# Patient Record
Sex: MALE | Race: WHITE | URBAN - METROPOLITAN AREA
[De-identification: names, ages, dates, MRNs, and addresses within clinical notes are randomized per-mention and may not be internally consistent; named-entity substitution may affect disease eponyms.]

---

## 2018-09-12 PROCEDURE — 82570 ASSAY OF URINE CREATININE: CPT | Performed by: FAMILY MEDICINE

## 2018-09-12 PROCEDURE — 82043 UR ALBUMIN QUANTITATIVE: CPT | Performed by: FAMILY MEDICINE

## 2018-09-12 PROCEDURE — 84153 ASSAY OF PSA TOTAL: CPT | Performed by: FAMILY MEDICINE

## 2018-09-13 PROBLEM — E11.69 DYSLIPIDEMIA WITH LOW HIGH DENSITY LIPOPROTEIN (HDL) CHOLESTEROL WITH HYPERTRIGLYCERIDEMIA DUE TO TYPE 2 DIABETES MELLITUS  (HCC): Status: ACTIVE | Noted: 2018-09-13

## 2018-09-13 PROBLEM — E78.2 DYSLIPIDEMIA WITH LOW HIGH DENSITY LIPOPROTEIN (HDL) CHOLESTEROL WITH HYPERTRIGLYCERIDEMIA DUE TO TYPE 2 DIABETES MELLITUS: Status: ACTIVE | Noted: 2018-09-13

## 2018-09-13 PROBLEM — E06.3 HASHIMOTO'S THYROIDITIS: Status: ACTIVE | Noted: 2018-09-13

## 2018-09-13 PROBLEM — E11.69 DYSLIPIDEMIA WITH LOW HIGH DENSITY LIPOPROTEIN (HDL) CHOLESTEROL WITH HYPERTRIGLYCERIDEMIA DUE TO TYPE 2 DIABETES MELLITUS: Status: ACTIVE | Noted: 2018-09-13

## 2018-09-13 PROBLEM — I15.2 HYPERTENSION ASSOCIATED WITH DIABETES (HCC): Status: ACTIVE | Noted: 2018-09-13

## 2018-09-13 PROBLEM — I15.2 HYPERTENSION ASSOCIATED WITH DIABETES: Status: ACTIVE | Noted: 2018-09-13

## 2018-09-13 PROBLEM — E78.2 DYSLIPIDEMIA WITH LOW HIGH DENSITY LIPOPROTEIN (HDL) CHOLESTEROL WITH HYPERTRIGLYCERIDEMIA DUE TO TYPE 2 DIABETES MELLITUS (HCC): Status: ACTIVE | Noted: 2018-09-13

## 2018-09-13 PROBLEM — E11.59 HYPERTENSION ASSOCIATED WITH DIABETES: Status: ACTIVE | Noted: 2018-09-13

## 2018-09-13 PROBLEM — E11.69 DYSLIPIDEMIA WITH LOW HIGH DENSITY LIPOPROTEIN (HDL) CHOLESTEROL WITH HYPERTRIGLYCERIDEMIA DUE TO TYPE 2 DIABETES MELLITUS (HCC): Status: ACTIVE | Noted: 2018-09-13

## 2018-09-13 PROBLEM — E11.59 HYPERTENSION ASSOCIATED WITH DIABETES (HCC): Status: ACTIVE | Noted: 2018-09-13

## 2018-09-13 PROBLEM — I15.2 HYPERTENSION ASSOCIATED WITH DIABETES  (HCC): Status: ACTIVE | Noted: 2018-09-13

## 2018-09-13 PROBLEM — E11.59 HYPERTENSION ASSOCIATED WITH DIABETES  (HCC): Status: ACTIVE | Noted: 2018-09-13

## 2018-09-13 PROBLEM — E78.2 DYSLIPIDEMIA WITH LOW HIGH DENSITY LIPOPROTEIN (HDL) CHOLESTEROL WITH HYPERTRIGLYCERIDEMIA DUE TO TYPE 2 DIABETES MELLITUS  (HCC): Status: ACTIVE | Noted: 2018-09-13

## 2018-12-07 PROBLEM — R80.9 TYPE 2 DIABETES MELLITUS WITH MICROALBUMINURIA, WITH LONG-TERM CURRENT USE OF INSULIN (HCC): Status: ACTIVE | Noted: 2018-12-07

## 2018-12-07 PROBLEM — Z79.4 TYPE 2 DIABETES MELLITUS WITH MICROALBUMINURIA, WITH LONG-TERM CURRENT USE OF INSULIN (HCC): Status: ACTIVE | Noted: 2018-12-07

## 2018-12-07 PROBLEM — E11.29 TYPE 2 DIABETES MELLITUS WITH MICROALBUMINURIA, WITH LONG-TERM CURRENT USE OF INSULIN (HCC): Status: ACTIVE | Noted: 2018-12-07

## 2020-12-09 PROBLEM — I10 ESSENTIAL HYPERTENSION: Status: ACTIVE | Noted: 2018-09-13

## 2020-12-09 PROBLEM — N52.9 ERECTILE DYSFUNCTION, UNSPECIFIED ERECTILE DYSFUNCTION TYPE: Status: ACTIVE | Noted: 2020-12-09

## 2021-05-12 ENCOUNTER — HOSPITAL ENCOUNTER (EMERGENCY)
Facility: HOSPITAL | Age: 51
Discharge: HOME | End: 2021-05-13
Attending: EMERGENCY MEDICINE
Payer: COMMERCIAL

## 2021-05-12 ENCOUNTER — APPOINTMENT (OUTPATIENT)
Dept: CT IMAGING | Facility: HOSPITAL | Age: 51
End: 2021-05-12
Attending: EMERGENCY MEDICINE
Payer: COMMERCIAL

## 2021-05-12 DIAGNOSIS — R51.9 NONINTRACTABLE HEADACHE, UNSPECIFIED CHRONICITY PATTERN, UNSPECIFIED HEADACHE TYPE: Primary | ICD-10-CM

## 2021-05-12 PROCEDURE — 99284 EMERGENCY DEPT VISIT MOD MDM: CPT

## 2021-05-12 PROCEDURE — 70450 CT HEAD/BRAIN W/O DYE: CPT | Performed by: EMERGENCY MEDICINE

## 2021-05-12 PROCEDURE — 85025 COMPLETE CBC W/AUTO DIFF WBC: CPT | Performed by: EMERGENCY MEDICINE

## 2021-05-12 PROCEDURE — 96374 THER/PROPH/DIAG INJ IV PUSH: CPT

## 2021-05-12 PROCEDURE — 96361 HYDRATE IV INFUSION ADD-ON: CPT

## 2021-05-12 PROCEDURE — 96375 TX/PRO/DX INJ NEW DRUG ADDON: CPT

## 2021-05-12 PROCEDURE — 80053 COMPREHEN METABOLIC PANEL: CPT | Performed by: EMERGENCY MEDICINE

## 2021-05-12 RX ORDER — MORPHINE SULFATE 4 MG/ML
4 INJECTION, SOLUTION INTRAMUSCULAR; INTRAVENOUS EVERY 30 MIN PRN
Status: DISCONTINUED | OUTPATIENT
Start: 2021-05-12 | End: 2021-05-13

## 2021-05-13 VITALS
SYSTOLIC BLOOD PRESSURE: 143 MMHG | BODY MASS INDEX: 32.28 KG/M2 | TEMPERATURE: 99 F | HEART RATE: 85 BPM | RESPIRATION RATE: 18 BRPM | OXYGEN SATURATION: 90 % | HEIGHT: 68 IN | WEIGHT: 213 LBS | DIASTOLIC BLOOD PRESSURE: 72 MMHG

## 2021-05-13 RX ORDER — ONDANSETRON 4 MG/1
4 TABLET, ORALLY DISINTEGRATING ORAL EVERY 4 HOURS PRN
Qty: 10 TABLET | Refills: 0 | Status: SHIPPED | OUTPATIENT
Start: 2021-05-13 | End: 2021-05-20

## 2021-05-13 RX ORDER — KETOROLAC TROMETHAMINE 30 MG/ML
15 INJECTION, SOLUTION INTRAMUSCULAR; INTRAVENOUS ONCE
Status: COMPLETED | OUTPATIENT
Start: 2021-05-13 | End: 2021-05-13

## 2021-05-13 NOTE — ED PROVIDER NOTES
Patient Seen in: [facility] Emergency Department      History   Patient presents with:  Headache    Stated Complaint: headache     HPI/Subjective:   HPI    Kamron Hardy is a pleasant 49-year-old insulin-dependent diabetic who presents emergency department with no auricular preauricular or mastoid tenderness. Oropharyngeal exam shows no uvula edema or shift. There is no tongue elevation and palatine tonsils show no purulent material or erythema.   No submandibular erythema and no tenderness along the sterno and contributing to the patient's headache.   Patient has no signs of neurologic deficits he will be discharged home he is to return the emergency department for worsening symptoms other complaints                             Disposition and Plan     Clinic

## 2021-11-17 PROBLEM — IMO0002: Status: ACTIVE | Noted: 2018-12-07

## 2021-11-17 PROBLEM — E11.65: Status: ACTIVE | Noted: 2018-12-07

## 2021-11-17 PROBLEM — E11.3393: Status: ACTIVE | Noted: 2018-12-07

## 2021-12-27 ENCOUNTER — IMMUNIZATION (OUTPATIENT)
Dept: LAB | Facility: HOSPITAL | Age: 51
End: 2021-12-27
Attending: EMERGENCY MEDICINE
Payer: COMMERCIAL

## 2021-12-27 DIAGNOSIS — Z23 NEED FOR VACCINATION: Primary | ICD-10-CM

## 2021-12-27 PROCEDURE — 0001A SARSCOV2 VAC 30MCG/0.3ML IM: CPT

## 2022-01-17 ENCOUNTER — IMMUNIZATION (OUTPATIENT)
Dept: LAB | Facility: HOSPITAL | Age: 52
End: 2022-01-17
Attending: EMERGENCY MEDICINE
Payer: COMMERCIAL

## 2022-01-17 DIAGNOSIS — Z23 NEED FOR VACCINATION: Primary | ICD-10-CM

## 2022-01-17 PROCEDURE — 0002A SARSCOV2 VAC 30MCG/0.3ML IM: CPT

## 2022-01-17 PROCEDURE — 0052A SARSCOV2 VAC 30MCG/0.3ML IM: CPT

## 2022-09-13 ENCOUNTER — IMMUNIZATION (OUTPATIENT)
Dept: LAB | Age: 52
End: 2022-09-13
Attending: EMERGENCY MEDICINE
Payer: COMMERCIAL

## 2022-09-13 DIAGNOSIS — Z23 NEED FOR VACCINATION: Primary | ICD-10-CM

## 2022-09-13 PROCEDURE — 0124A SARSCOV2 VAC BVL 30MCG/0.3ML: CPT

## 2023-08-19 ENCOUNTER — APPOINTMENT (OUTPATIENT)
Dept: CT IMAGING | Facility: HOSPITAL | Age: 53
End: 2023-08-19
Attending: EMERGENCY MEDICINE
Payer: COMMERCIAL

## 2023-08-19 ENCOUNTER — APPOINTMENT (OUTPATIENT)
Dept: GENERAL RADIOLOGY | Facility: HOSPITAL | Age: 53
End: 2023-08-19
Attending: EMERGENCY MEDICINE
Payer: COMMERCIAL

## 2023-08-19 ENCOUNTER — HOSPITAL ENCOUNTER (INPATIENT)
Facility: HOSPITAL | Age: 53
LOS: 4 days | Discharge: HOME | End: 2023-08-23
Attending: EMERGENCY MEDICINE | Admitting: HOSPITALIST
Payer: COMMERCIAL

## 2023-08-19 ENCOUNTER — APPOINTMENT (OUTPATIENT)
Dept: MRI IMAGING | Facility: HOSPITAL | Age: 53
End: 2023-08-19
Attending: Other
Payer: COMMERCIAL

## 2023-08-19 DIAGNOSIS — R47.81 SLURRED SPEECH: ICD-10-CM

## 2023-08-19 DIAGNOSIS — R53.1 WEAKNESS GENERALIZED: Primary | ICD-10-CM

## 2023-08-19 LAB
ALBUMIN SERPL-MCNC: 3.9 G/DL (ref 3.4–5)
ALBUMIN/GLOB SERPL: 1 {RATIO} (ref 1–2)
ALP LIVER SERPL-CCNC: 86 U/L
ALT SERPL-CCNC: 31 U/L
ANION GAP SERPL CALC-SCNC: 5 MMOL/L (ref 0–18)
APTT PPP: 24.8 SECONDS (ref 23.3–35.6)
AST SERPL-CCNC: 16 U/L (ref 15–37)
ATRIAL RATE: 77 BPM
BASOPHILS # BLD AUTO: 0.08 X10(3) UL (ref 0–0.2)
BASOPHILS NFR BLD AUTO: 1.1 %
BILIRUB SERPL-MCNC: 0.8 MG/DL (ref 0.1–2)
BUN BLD-MCNC: 23 MG/DL (ref 7–18)
CALCIUM BLD-MCNC: 9.5 MG/DL (ref 8.5–10.1)
CHLORIDE SERPL-SCNC: 107 MMOL/L (ref 98–112)
CK SERPL-CCNC: 80 U/L
CO2 SERPL-SCNC: 24 MMOL/L (ref 21–32)
CREAT BLD-MCNC: 1.27 MG/DL
EGFRCR SERPLBLD CKD-EPI 2021: 67 ML/MIN/1.73M2 (ref 60–?)
EOSINOPHIL # BLD AUTO: 0.31 X10(3) UL (ref 0–0.7)
EOSINOPHIL NFR BLD AUTO: 4.4 %
ERYTHROCYTE [DISTWIDTH] IN BLOOD BY AUTOMATED COUNT: 12.6 %
EST. AVERAGE GLUCOSE BLD GHB EST-MCNC: 200 MG/DL (ref 68–126)
GLOBULIN PLAS-MCNC: 4 G/DL (ref 2.8–4.4)
GLUCOSE BLD-MCNC: 123 MG/DL (ref 70–99)
GLUCOSE BLD-MCNC: 129 MG/DL (ref 70–99)
GLUCOSE BLD-MCNC: 140 MG/DL (ref 70–99)
GLUCOSE BLD-MCNC: 157 MG/DL (ref 70–99)
HBA1C MFR BLD: 8.6 % (ref ?–5.7)
HCT VFR BLD AUTO: 44.9 %
HGB BLD-MCNC: 15.3 G/DL
IGA SERPL-MCNC: 287 MG/DL (ref 70–312)
IMM GRANULOCYTES # BLD AUTO: 0.02 X10(3) UL (ref 0–1)
IMM GRANULOCYTES NFR BLD: 0.3 %
INR BLD: 0.96 (ref 0.85–1.16)
LYMPHOCYTES # BLD AUTO: 2.26 X10(3) UL (ref 1–4)
LYMPHOCYTES NFR BLD AUTO: 32.2 %
MCH RBC QN AUTO: 28.7 PG (ref 26–34)
MCHC RBC AUTO-ENTMCNC: 34.1 G/DL (ref 31–37)
MCV RBC AUTO: 84.1 FL
MONOCYTES # BLD AUTO: 0.59 X10(3) UL (ref 0.1–1)
MONOCYTES NFR BLD AUTO: 8.4 %
NEUTROPHILS # BLD AUTO: 3.75 X10 (3) UL (ref 1.5–7.7)
NEUTROPHILS # BLD AUTO: 3.75 X10(3) UL (ref 1.5–7.7)
NEUTROPHILS NFR BLD AUTO: 53.6 %
OSMOLALITY SERPL CALC.SUM OF ELEC: 289 MOSM/KG (ref 275–295)
P AXIS: 42 DEGREES
P-R INTERVAL: 140 MS
PLATELET # BLD AUTO: 245 10(3)UL (ref 150–450)
POTASSIUM SERPL-SCNC: 4.5 MMOL/L (ref 3.5–5.1)
PROT SERPL-MCNC: 7.9 G/DL (ref 6.4–8.2)
PROTHROMBIN TIME: 12.8 SECONDS (ref 11.6–14.8)
Q-T INTERVAL: 378 MS
QRS DURATION: 94 MS
QTC CALCULATION (BEZET): 427 MS
R AXIS: 14 DEGREES
RBC # BLD AUTO: 5.34 X10(6)UL
SODIUM SERPL-SCNC: 136 MMOL/L (ref 136–145)
T AXIS: 40 DEGREES
TROPONIN I HIGH SENSITIVITY: 4 NG/L
TSI SER-ACNC: 2.14 MIU/ML (ref 0.36–3.74)
VENTRICULAR RATE: 77 BPM
WBC # BLD AUTO: 7 X10(3) UL (ref 4–11)

## 2023-08-19 PROCEDURE — 70450 CT HEAD/BRAIN W/O DYE: CPT | Performed by: EMERGENCY MEDICINE

## 2023-08-19 PROCEDURE — 70551 MRI BRAIN STEM W/O DYE: CPT | Performed by: OTHER

## 2023-08-19 PROCEDURE — 70547 MR ANGIOGRAPHY NECK W/O DYE: CPT | Performed by: OTHER

## 2023-08-19 PROCEDURE — 009U3ZX DRAINAGE OF SPINAL CANAL, PERCUTANEOUS APPROACH, DIAGNOSTIC: ICD-10-PCS | Performed by: RADIOLOGY

## 2023-08-19 PROCEDURE — 71045 X-RAY EXAM CHEST 1 VIEW: CPT | Performed by: EMERGENCY MEDICINE

## 2023-08-19 PROCEDURE — 70544 MR ANGIOGRAPHY HEAD W/O DYE: CPT | Performed by: OTHER

## 2023-08-19 PROCEDURE — 73110 X-RAY EXAM OF WRIST: CPT | Performed by: EMERGENCY MEDICINE

## 2023-08-19 PROCEDURE — 99223 1ST HOSP IP/OBS HIGH 75: CPT | Performed by: OTHER

## 2023-08-19 RX ORDER — HYDRALAZINE HYDROCHLORIDE 20 MG/ML
10 INJECTION INTRAMUSCULAR; INTRAVENOUS EVERY 2 HOUR PRN
Status: DISCONTINUED | OUTPATIENT
Start: 2023-08-19 | End: 2023-08-23

## 2023-08-19 RX ORDER — SODIUM CHLORIDE 9 MG/ML
INJECTION, SOLUTION INTRAVENOUS CONTINUOUS
Status: DISCONTINUED | OUTPATIENT
Start: 2023-08-19 | End: 2023-08-23

## 2023-08-19 RX ORDER — ASPIRIN 300 MG/1
300 SUPPOSITORY RECTAL DAILY
Status: DISCONTINUED | OUTPATIENT
Start: 2023-08-20 | End: 2023-08-21

## 2023-08-19 RX ORDER — LEVOTHYROXINE SODIUM 0.15 MG/1
150 TABLET ORAL
Status: DISCONTINUED | OUTPATIENT
Start: 2023-08-20 | End: 2023-08-23

## 2023-08-19 RX ORDER — NICOTINE POLACRILEX 4 MG
15 LOZENGE BUCCAL
Status: DISCONTINUED | OUTPATIENT
Start: 2023-08-19 | End: 2023-08-23

## 2023-08-19 RX ORDER — PROCHLORPERAZINE EDISYLATE 5 MG/ML
5 INJECTION INTRAMUSCULAR; INTRAVENOUS EVERY 8 HOURS PRN
Status: DISCONTINUED | OUTPATIENT
Start: 2023-08-19 | End: 2023-08-23

## 2023-08-19 RX ORDER — ASPIRIN 325 MG
325 TABLET, DELAYED RELEASE (ENTERIC COATED) ORAL DAILY
Status: DISCONTINUED | OUTPATIENT
Start: 2023-08-19 | End: 2023-08-19

## 2023-08-19 RX ORDER — LABETALOL HYDROCHLORIDE 5 MG/ML
10 INJECTION, SOLUTION INTRAVENOUS EVERY 10 MIN PRN
Status: DISCONTINUED | OUTPATIENT
Start: 2023-08-19 | End: 2023-08-23

## 2023-08-19 RX ORDER — ACETAMINOPHEN 650 MG/1
650 SUPPOSITORY RECTAL EVERY 4 HOURS PRN
Status: DISCONTINUED | OUTPATIENT
Start: 2023-08-19 | End: 2023-08-23

## 2023-08-19 RX ORDER — ONDANSETRON 2 MG/ML
4 INJECTION INTRAMUSCULAR; INTRAVENOUS EVERY 6 HOURS PRN
Status: DISCONTINUED | OUTPATIENT
Start: 2023-08-19 | End: 2023-08-23

## 2023-08-19 RX ORDER — LISINOPRIL 10 MG/1
10 TABLET ORAL DAILY
Status: DISCONTINUED | OUTPATIENT
Start: 2023-08-20 | End: 2023-08-19

## 2023-08-19 RX ORDER — NICOTINE POLACRILEX 4 MG
30 LOZENGE BUCCAL
Status: DISCONTINUED | OUTPATIENT
Start: 2023-08-19 | End: 2023-08-23

## 2023-08-19 RX ORDER — ACETAMINOPHEN 325 MG/1
650 TABLET ORAL EVERY 4 HOURS PRN
Status: DISCONTINUED | OUTPATIENT
Start: 2023-08-19 | End: 2023-08-23

## 2023-08-19 RX ORDER — ACETAMINOPHEN 500 MG
1000 TABLET ORAL ONCE
Status: COMPLETED | OUTPATIENT
Start: 2023-08-19 | End: 2023-08-19

## 2023-08-19 RX ORDER — ACETAMINOPHEN 500 MG
500 TABLET ORAL EVERY 4 HOURS PRN
Status: DISCONTINUED | OUTPATIENT
Start: 2023-08-19 | End: 2023-08-23

## 2023-08-19 RX ORDER — ONDANSETRON 2 MG/ML
4 INJECTION INTRAMUSCULAR; INTRAVENOUS EVERY 6 HOURS PRN
Status: DISCONTINUED | OUTPATIENT
Start: 2023-08-19 | End: 2023-08-19

## 2023-08-19 RX ORDER — ASPIRIN 325 MG
325 TABLET ORAL DAILY
Status: DISCONTINUED | OUTPATIENT
Start: 2023-08-19 | End: 2023-08-19

## 2023-08-19 RX ORDER — DEXTROSE MONOHYDRATE 25 G/50ML
50 INJECTION, SOLUTION INTRAVENOUS
Status: DISCONTINUED | OUTPATIENT
Start: 2023-08-19 | End: 2023-08-23

## 2023-08-19 RX ORDER — SODIUM CHLORIDE 9 MG/ML
INJECTION, SOLUTION INTRAVENOUS CONTINUOUS
Status: ACTIVE | OUTPATIENT
Start: 2023-08-20 | End: 2023-08-22

## 2023-08-19 RX ORDER — HEPARIN SODIUM 5000 [USP'U]/ML
5000 INJECTION, SOLUTION INTRAVENOUS; SUBCUTANEOUS EVERY 8 HOURS SCHEDULED
Status: DISCONTINUED | OUTPATIENT
Start: 2023-08-19 | End: 2023-08-23

## 2023-08-19 RX ORDER — ASPIRIN 325 MG
325 TABLET ORAL DAILY
Status: DISCONTINUED | OUTPATIENT
Start: 2023-08-20 | End: 2023-08-21

## 2023-08-19 NOTE — ED INITIAL ASSESSMENT (HPI)
Patient reports c/o bilateral leg/arm weakness, difficulty with concentration, and lower back pain x weeks.

## 2023-08-19 NOTE — ED QUICK NOTES
Orders for admission, patient is aware of plan and ready to go upstairs. Any questions, please call ED RN christina at extension #[phone].      Patient Covid vaccination status: Fully vaccinated     COVID Test Ordered in ED: None    COVID Suspicion at Admission: N/A    Running Infusions:  None    Mental Status/LOC at time of transport: a0x3    Other pertinent information:   CIWA score: N/A   NIH score:  1

## 2023-08-19 NOTE — PROGRESS NOTES
NURSING ADMISSION NOTE      Assumed care of patient when transferred from ED. Neurology and hospitalist notified of consults. Neuro assessments per order. Reports numbness to lips and bilat hands, left greater than right hand. Right hand slight ataxia, states has to concentrate to move rt hand  Reports symptoms worsen with standing and starts to slur occasional word. States symptoms improve once laying back down. MRI stat ordered, paged on call MRI tech via  per MRI department due to long list of ED stat MRI's. Spoke to Radiology dept regarding lumbar puncture, states unable to complete until Monday and SubQ Heparin needs to be held 8-10 hrs prior and stat PT/INR completed morning off L.P. Patient updated on POC. Patient admitted via [address] to room and floor. Safety precautions initiated. Bed in low position. Call light in reach.

## 2023-08-19 NOTE — PROGRESS NOTES
Orthostatic BP results     08/19/23 1334 08/19/23 1336 08/19/23 1338   Vital Signs   /72 126/67 113/72   MAP (mmHg) 87 82 79   BP Location Left arm Left arm Left arm   BP Method Automatic Automatic Automatic   Patient Position Lying Sitting Standing

## 2023-08-20 ENCOUNTER — APPOINTMENT (OUTPATIENT)
Dept: CV DIAGNOSTICS | Facility: HOSPITAL | Age: 53
End: 2023-08-20
Attending: HOSPITALIST
Payer: COMMERCIAL

## 2023-08-20 ENCOUNTER — APPOINTMENT (OUTPATIENT)
Dept: CT IMAGING | Facility: HOSPITAL | Age: 53
End: 2023-08-20
Attending: Other
Payer: COMMERCIAL

## 2023-08-20 LAB
ANION GAP SERPL CALC-SCNC: 3 MMOL/L (ref 0–18)
BASOPHILS # BLD AUTO: 0.06 X10(3) UL (ref 0–0.2)
BASOPHILS NFR BLD AUTO: 0.9 %
BUN BLD-MCNC: 18 MG/DL (ref 7–18)
CALCIUM BLD-MCNC: 8.6 MG/DL (ref 8.5–10.1)
CHLORIDE SERPL-SCNC: 105 MMOL/L (ref 98–112)
CHOLEST SERPL-MCNC: 232 MG/DL (ref ?–200)
CO2 SERPL-SCNC: 26 MMOL/L (ref 21–32)
CREAT BLD-MCNC: 0.93 MG/DL
EGFRCR SERPLBLD CKD-EPI 2021: 98 ML/MIN/1.73M2 (ref 60–?)
EOSINOPHIL # BLD AUTO: 0.35 X10(3) UL (ref 0–0.7)
EOSINOPHIL NFR BLD AUTO: 5.5 %
ERYTHROCYTE [DISTWIDTH] IN BLOOD BY AUTOMATED COUNT: 12.4 %
GLUCOSE BLD-MCNC: 115 MG/DL (ref 70–99)
GLUCOSE BLD-MCNC: 142 MG/DL (ref 70–99)
GLUCOSE BLD-MCNC: 149 MG/DL (ref 70–99)
GLUCOSE BLD-MCNC: 149 MG/DL (ref 70–99)
GLUCOSE BLD-MCNC: 175 MG/DL (ref 70–99)
GLUCOSE BLD-MCNC: 202 MG/DL (ref 70–99)
HCT VFR BLD AUTO: 45.4 %
HDLC SERPL-MCNC: 41 MG/DL (ref 40–59)
HGB BLD-MCNC: 14.9 G/DL
IMM GRANULOCYTES # BLD AUTO: 0.01 X10(3) UL (ref 0–1)
IMM GRANULOCYTES NFR BLD: 0.2 %
LDLC SERPL CALC-MCNC: 141 MG/DL (ref ?–100)
LYMPHOCYTES # BLD AUTO: 2.35 X10(3) UL (ref 1–4)
LYMPHOCYTES NFR BLD AUTO: 37.1 %
MAGNESIUM SERPL-MCNC: 2.1 MG/DL (ref 1.6–2.6)
MCH RBC QN AUTO: 29.1 PG (ref 26–34)
MCHC RBC AUTO-ENTMCNC: 32.8 G/DL (ref 31–37)
MCV RBC AUTO: 88.7 FL
MONOCYTES # BLD AUTO: 0.61 X10(3) UL (ref 0.1–1)
MONOCYTES NFR BLD AUTO: 9.6 %
NEUTROPHILS # BLD AUTO: 2.96 X10 (3) UL (ref 1.5–7.7)
NEUTROPHILS # BLD AUTO: 2.96 X10(3) UL (ref 1.5–7.7)
NEUTROPHILS NFR BLD AUTO: 46.7 %
NONHDLC SERPL-MCNC: 191 MG/DL (ref ?–130)
OSMOLALITY SERPL CALC.SUM OF ELEC: 283 MOSM/KG (ref 275–295)
PLATELET # BLD AUTO: 237 10(3)UL (ref 150–450)
POTASSIUM SERPL-SCNC: 4.2 MMOL/L (ref 3.5–5.1)
RBC # BLD AUTO: 5.12 X10(6)UL
SODIUM SERPL-SCNC: 134 MMOL/L (ref 136–145)
TRIGL SERPL-MCNC: 277 MG/DL (ref 30–149)
VIT B12 SERPL-MCNC: 1395 PG/ML (ref 193–986)
VLDLC SERPL CALC-MCNC: 52 MG/DL (ref 0–30)
WBC # BLD AUTO: 6.3 X10(3) UL (ref 4–11)

## 2023-08-20 PROCEDURE — 93306 TTE W/DOPPLER COMPLETE: CPT | Performed by: HOSPITALIST

## 2023-08-20 PROCEDURE — 70498 CT ANGIOGRAPHY NECK: CPT | Performed by: OTHER

## 2023-08-20 PROCEDURE — 70496 CT ANGIOGRAPHY HEAD: CPT | Performed by: OTHER

## 2023-08-20 PROCEDURE — 95937 NEUROMUSCULAR JUNCTION TEST: CPT | Performed by: OTHER

## 2023-08-20 PROCEDURE — 99233 SBSQ HOSP IP/OBS HIGH 50: CPT | Performed by: OTHER

## 2023-08-20 PROCEDURE — 95886 MUSC TEST DONE W/N TEST COMP: CPT | Performed by: OTHER

## 2023-08-20 PROCEDURE — 95913 NRV CNDJ TEST 13/> STUDIES: CPT | Performed by: OTHER

## 2023-08-20 RX ORDER — ATORVASTATIN CALCIUM 40 MG/1
40 TABLET, FILM COATED ORAL DAILY
Status: DISCONTINUED | OUTPATIENT
Start: 2023-08-20 | End: 2023-08-21

## 2023-08-20 NOTE — PROGRESS NOTES
08/20/23 1025 08/20/23 1030 08/20/23 1035   Vital Signs   Pulse 86 89 91   Resp 18 15 20   BP (!) 136/98 144/77 (!) 166/80   MAP (mmHg) 105 93 101   Patient Position Lying Sitting Standing

## 2023-08-20 NOTE — DISCHARGE PLANNING
Attn: CENTRAL SCHEDULING DEPT.     Patient follow-up appointment information:    Visit Type: Stroke follow-up  Date of Stroke: 8/19/2023  Type of Stroke: Ischemic  Patient to follow-up: within 4 weeks of discharge date  OP Neurologist: Any available neurologist  Anticipated discharge (if known): TBD  Discharge disposition (if known): Home      Terry Pino RN, BSN  Stroke Navigator  [phone]

## 2023-08-20 NOTE — PHYSICAL THERAPY NOTE
PHYSICAL THERAPY EVALUATION - INPATIENT     Room Number: 9561/1555-R  Evaluation Date: 8/20/2023  Type of Evaluation: Initial  Physician Order: PT Eval and Treat    Presenting Problem: weakness, LBP  Co-Morbidities : DM GERD hypothyroid  Reason for Therapy: Mobility Dysfunction and Discharge Planning      ASSESSMENT   Pt is a 45year old male admitted on 8/19/2023 for arm & leg weakness as well as facial numbness and LBP x 2 weeks. Functional outcome measures completed include AMPAC and Modified [place]. Workup revealed Acute lacunar infarction in the left thalamus, LP pending. The AM-PAC '6-Clicks' Inpatient Basic Mobility Short Form was completed and this patient is demonstrating a Approx Degree of Impairment: 0%  degree of impairment in mobility. Research supports that patients with this level of impairment may benefit from return home. PT Discharge Recommendations: Home      PLAN  Patient has been evaluated and presents with no skilled Physical Therapy needs at this time. Patient discharged from Physical Therapy services. Please re-order if a new functional limitation presents during this admission. GOALS  Patient was able to achieve the following goals . ..     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, can work remotely wife at home, reports back injury in past when lifting his dog    SUBJECTIVE  \"I just get weakness and numbness when I am up too long\"      OBJECTIVE  Precautions: Bed/chair alarm  Fall Risk: Standard fall risk    WEIGHT BEARING RESTRICTION  Weight Bearing Restriction: None                PAIN ASSESSMENT  Ratin  Location: denies initially       COGNITION  Overall Cognitive Status:  WFL - within functional limits  Orientation Level:  oriented x4  Following Commands:  follows all commands and directions without difficulty  Safety Judgement:  good awareness of safety precautions    RANGE OF MOTION AND STRENGTH ASSESSMENT  Upper extremity ROM and strength are see OT assessment    Lower extremity ROM is within functional limits     Lower extremity strength is within functional limits       BALANCE  Static Sitting: Normal  Dynamic Sitting: Good  Static Standing: Normal  Dynamic Standing: Good    ADDITIONAL TESTS  Additional Tests: Modified [place]              Modified [place]: 1                  ACTIVITY TOLERANCE           BP: (!) 136/98  BP Location: Left arm  BP Method: Automatic  Patient Position: Lying    O2 WALK       NEUROLOGICAL FINDINGS  Neurological Findings: Coordination - Heel to Shin;Coordination - Rapid Alternating Movement;Sensation     Coordination - Heel to Shin: Symmetrical  Coordination - Rapid Alternating Movement: Symmetrical  Sensation: light touch symmetrical BLEs         AM-PAC '6-Clicks' INPATIENT SHORT FORM - BASIC MOBILITY  How much difficulty does the patient currently have. .. Patient Difficulty: Turning over in bed (including adjusting bedclothes, sheets and blankets)?: None   Patient Difficulty: Sitting down on and standing up from a chair with arms (e.g., wheelchair, bedside commode, etc.): None   Patient Difficulty: Moving from lying on back to sitting on the side of the bed?: None   How much help from another person does the patient currently need. ..    Help from Another: Moving to and from a bed to a chair (including a wheelchair)?: None   Help from Another: Need to walk in hospital room?: None   Help from Another: Climbing 3-5 steps with a railing?: None       AM-PAC Score:  Raw Score: 24   Approx Degree of Impairment: 0%   Standardized Score (AM-PAC Scale): 61.14   CMS Modifier (G-Code): CH    FUNCTIONAL ABILITY STATUS  Gait Assessment   Functional Mobility/Gait Assessment  Gait Assistance: Modified independent  Distance (ft): 200  Assistive Device: None  Pattern:  (miildly decresaed samra)  Stairs: Stairs  How Many Stairs: 4  Device: 1 Rail  Assist: Modified independent  Pattern: Ascend and Descend  Ascend and Descend : Step to    Skilled Therapy Provided     Bed Mobility:  Rolling: Ind  Supine to sit: Ind   Sit to supine: NT     Transfer Mobility:  Sit to stand: Ind   Stand to sit: Ind  Gait = Ind    Therapist's comments:performed steps in step to manner for safety due to reports of leg weakness, no buckling noted. Patient educated in role of IP PT services and aware of current discharge from services. Educated to be up in chair and to ambulate on unit with assistance. Patient did reports mild facial numbness inferior to lip after gait/stair assessment and that legs felt \"rubbery\". Exercise/Education Provided:  Bed mobility  Functional activity tolerated  Gait training  Transfer training  Discharge planning    Patient End of Session: Up in chair;Needs met;Call light within reach;RN aware of session/findings; Alarm set    Patient Evaluation Complexity Level:  History Moderate - 1 or 2 personal factors and/or co-morbidities   Examination of body systems Moderate - addressing a total of 3 or more elements   Clinical Presentation Low - Stable   Clinical Decision Making Low Complexity       PT Session Time: 35 minutes  Gait Training: 10 minutes  Therapeutic Activity: 15 minutes

## 2023-08-20 NOTE — SLP NOTE
ADULT SWALLOWING EVALUATION    ASSESSMENT    ASSESSMENT/OVERALL IMPRESSION:  Order received as per stroke protocol, chart reviewed. Patient presented with increased weakness, transient labial numbness, and transient \"slurred speech. \"  Patient went for MRI which confirmed acute left thalamic infarct. 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 Patient received awake, alert, and pleasant. Patient reported symptoms arise when he stands up for period of time. No symptoms when he is sitting. Patient with no focal oral motor deficits at this time. Patient reported feeling numbness to medial labial surface however not at this time. Patient reported no concerns or difficulty with chewing or swallowing. Patient with clear voice and 100% speech intelligibility. Patient exhibited intact oropharyngeal swallow with no evidence or suspect clinical s/s aspiration. Recommend con't PO diet as tolerated. No dysphagia follow-up indicated at this time. [place] Assessment of Swallow Function Score: No abnormality detected    RECOMMENDATIONS   Diet Recommendations - Solids: Regular  Diet Recommendations - Liquids: Thin Liquids  Medication Administration Recommendations: No restrictions  Treatment Plan/Recommendations: Communication evaluation       HISTORY   MEDICAL HISTORY  Reason for Referral: Stroke protocol    Problem List  Principal Problem:    Weakness generalized  Active Problems:    Slurred speech    Weakness      Past Medical History  Past Medical History:   Diagnosis Date    Diabetes ([facility] [address]     GERD     HYPERLIPIDEMIA     Unspecified hypothyroidism        Prior Living Situation: Home with spouse  Diet Prior to Admission: Regular; Thin liquids       Patient/Family Goals: to get back to normal      SWALLOWING HISTORY  Current Diet Consistency: Regular; Thin liquids  Dysphagia History: None  Imaging Results: as per above        OBJECTIVE   ORAL MOTOR EXAMINATION  Dentition: Functional  Symmetry: Within Functional Limits  Strength: Within Functional Limits  Tone: Within Functional Limits  Range of Motion: Within Functional Limits  Rate of Motion: Within Functional Limits    Voice Quality: Clear     Consistencies Trialed: Thin liquids  Method of Presentation: Self presentation  Patient Positioning: Upright;Midline    Oral Phase of Swallow: Within Functional Limits                      Pharyngeal Phase of Swallow: Within Functional Limits           (Please note: Silent aspiration cannot be evaluated clinically.  Videofluoroscopic Swallow Study is required to rule-out silent aspiration.)    Esophageal Phase of Swallow: No complaints consistent with possible esophageal involvement    GOALS  Goal #1 Communication/cog evaluation as per stroke protocol New     FOLLOW UP  Treatment Plan/Recommendations: Communication evaluation     Follow Up Needed (Documentation Required): Yes      Thank you for your referral.   If you have any questions, please contact Vianca Kaye, [address], kenneth Rojas 85 CCC-SLP/L, pager [address]

## 2023-08-20 NOTE — PLAN OF CARE
Stroke booklet given to patient. The following education was provided:    BEFAST- stroke warning signs and symptoms  How to activate EMS for stroke  Personalized risk factors including DM, HL and obesity  Healthy lifestyle (nutrition and exercise)  Need for follow up after discharge    Patient eager and receptive to teaching. He verbalizes understanding of education. All pertinent questions and concerns were addressed.

## 2023-08-20 NOTE — PLAN OF CARE
Assumed pt care at [address] [place] [address] for the night shift. Pt resting in bed in no apparent distress. Neuro assessment unchanged w/ intermittent leg weakness, lower lip and hands numbness. A/O x 4. Speech clear. Passed dysphagia screening. Eating dinner w/ no difficulty. MRI completed w/ study terminated prematurely. Received call w/ critical findings (+) for acute lacunar infarction in the lt thalamus, Dr. Becky Ríos notified. New orders received for CTA head and carodits, start ASA daily and keep on bedrest. Pt updated on poc. VSS. Afebrile. NSR on tele. Remains on RA w/ O2 sats >94%. All safety precaution in place. Will cont to monitor.   Problem: METABOLIC/FLUID AND ELECTROLYTES - ADULT  Goal: Glucose maintained within prescribed range  Description: INTERVENTIONS:  - Monitor Blood Glucose as ordered  - Assess for signs and symptoms of hyperglycemia and hypoglycemia  - Administer ordered medications to maintain glucose within target range  - Assess barriers to adequate nutritional intake and initiate nutrition consult as needed  - Instruct patient on self management of diabetes  Outcome: Progressing  Goal: Electrolytes maintained within normal limits  Description: INTERVENTIONS:  - Monitor labs and rhythm and assess patient for signs and symptoms of electrolyte imbalances  - Administer electrolyte replacement as ordered  - Monitor response to electrolyte replacements, including rhythm and repeat lab results as appropriate  - Fluid restriction as ordered  - Instruct patient on fluid and nutrition restrictions as appropriate  Outcome: Progressing     Problem: NEUROLOGICAL - ADULT  Goal: Achieves stable or improved neurological status  Description: INTERVENTIONS  - Assess for and report changes in neurological status  - Initiate measures to prevent increased intracranial pressure  - Maintain blood pressure and fluid volume within ordered parameters to optimize cerebral perfusion and minimize risk of hemorrhage  - Monitor temperature, glucose, and sodium.  Initiate appropriate interventions as ordered  Outcome: Progressing     Problem: Impaired Swallowing  Goal: Minimize aspiration risk  Description: Interventions:  - Patient should be alert and upright for all feedings (90 degrees preferred)  - Offer food and liquids at a slow rate  - No straws  - Encourage small bites of food and small sips of liquid  - Offer pills one at a time, crush or deliver with applesauce as needed  - Discontinue feeding and notify MD (or speech pathologist) if coughing or persistent throat clearing or wet/gurgly vocal quality is noted  Outcome: Progressing     Problem: Impaired Communication  Goal: Patient will achieve maximal communication potential  Description: Interventions:  - Allow additional time for processing after asking questions or providing instructions  Outcome: Progressing

## 2023-08-21 ENCOUNTER — APPOINTMENT (OUTPATIENT)
Dept: GENERAL RADIOLOGY | Facility: HOSPITAL | Age: 53
End: 2023-08-21
Attending: Other
Payer: COMMERCIAL

## 2023-08-21 PROBLEM — I63.9 CEREBROVASCULAR ACCIDENT (CVA) (HCC): Status: ACTIVE | Noted: 2023-08-21

## 2023-08-21 LAB
ALBUMIN SERPL-MCNC: 3.7 G/DL (ref 3.4–5)
ALBUMIN/GLOB SERPL: 1 {RATIO} (ref 1–2)
ALP LIVER SERPL-CCNC: 74 U/L
ALT SERPL-CCNC: 31 U/L
ANION GAP SERPL CALC-SCNC: 6 MMOL/L (ref 0–18)
AST SERPL-CCNC: 19 U/L (ref 15–37)
BASOPHILS # BLD AUTO: 0.06 X10(3) UL (ref 0–0.2)
BASOPHILS NFR BLD AUTO: 0.9 %
BILIRUB SERPL-MCNC: 0.9 MG/DL (ref 0.1–2)
BUN BLD-MCNC: 14 MG/DL (ref 7–18)
CALCIUM BLD-MCNC: 9 MG/DL (ref 8.5–10.1)
CHLORIDE SERPL-SCNC: 106 MMOL/L (ref 98–112)
CLARITY CSF: CLEAR
CO2 SERPL-SCNC: 25 MMOL/L (ref 21–32)
COLOR CSF: COLORLESS
CORTIS SERPL-MCNC: 21.8 UG/DL
COUNT PERFORMED ON TUBE: 4
CREAT BLD-MCNC: 0.97 MG/DL
EGFRCR SERPLBLD CKD-EPI 2021: 93 ML/MIN/1.73M2 (ref 60–?)
EOSINOPHIL # BLD AUTO: 0.31 X10(3) UL (ref 0–0.7)
EOSINOPHIL NFR BLD AUTO: 4.9 %
ERYTHROCYTE [DISTWIDTH] IN BLOOD BY AUTOMATED COUNT: 12.2 %
GLOBULIN PLAS-MCNC: 3.7 G/DL (ref 2.8–4.4)
GLUCOSE BLD-MCNC: 121 MG/DL (ref 70–99)
GLUCOSE BLD-MCNC: 140 MG/DL (ref 70–99)
GLUCOSE BLD-MCNC: 143 MG/DL (ref 70–99)
GLUCOSE BLD-MCNC: 144 MG/DL (ref 70–99)
GLUCOSE BLD-MCNC: 162 MG/DL (ref 70–99)
GLUCOSE BLD-MCNC: 167 MG/DL (ref 70–99)
GLUCOSE CSF-MCNC: 94 MG/DL (ref 40–70)
HCT VFR BLD AUTO: 46.6 %
HGB BLD-MCNC: 15.5 G/DL
IMM GRANULOCYTES # BLD AUTO: 0.01 X10(3) UL (ref 0–1)
IMM GRANULOCYTES NFR BLD: 0.2 %
INR BLD: 0.96 (ref 0.85–1.16)
LYMPHOCYTES # BLD AUTO: 2.54 X10(3) UL (ref 1–4)
LYMPHOCYTES NFR BLD AUTO: 40.1 %
MAGNESIUM SERPL-MCNC: 2.1 MG/DL (ref 1.6–2.6)
MCH RBC QN AUTO: 28.7 PG (ref 26–34)
MCHC RBC AUTO-ENTMCNC: 33.3 G/DL (ref 31–37)
MCV RBC AUTO: 86.3 FL
MONOCYTES # BLD AUTO: 0.5 X10(3) UL (ref 0.1–1)
MONOCYTES NFR BLD AUTO: 7.9 %
NEUTROPHILS # BLD AUTO: 2.92 X10 (3) UL (ref 1.5–7.7)
NEUTROPHILS # BLD AUTO: 2.92 X10(3) UL (ref 1.5–7.7)
NEUTROPHILS NFR BLD AUTO: 46 %
OSMOLALITY SERPL CALC.SUM OF ELEC: 288 MOSM/KG (ref 275–295)
PLATELET # BLD AUTO: 241 10(3)UL (ref 150–450)
POTASSIUM SERPL-SCNC: 4.1 MMOL/L (ref 3.5–5.1)
PROT PATTERN CSF ELPH-IMP: 52.9 MG/DL (ref 15–45)
PROT SERPL-MCNC: 7.4 G/DL (ref 6.4–8.2)
PROTHROMBIN TIME: 12.8 SECONDS (ref 11.6–14.8)
RBC # BLD AUTO: 5.4 X10(6)UL
RBC # CSF: 201 /MM3 (ref ?–1)
SODIUM SERPL-SCNC: 137 MMOL/L (ref 136–145)
TOTAL CELLS COUNTED CSF: 1 /MM3 (ref 0–5)
TOTAL VOLUME CSF: 11 ML
WBC # BLD AUTO: 6.3 X10(3) UL (ref 4–11)

## 2023-08-21 PROCEDURE — 99233 SBSQ HOSP IP/OBS HIGH 50: CPT | Performed by: OTHER

## 2023-08-21 PROCEDURE — 62328 DX LMBR SPI PNXR W/FLUOR/CT: CPT | Performed by: OTHER

## 2023-08-21 RX ORDER — CLOPIDOGREL BISULFATE 75 MG/1
75 TABLET ORAL DAILY
Status: DISCONTINUED | OUTPATIENT
Start: 2023-08-21 | End: 2023-08-23

## 2023-08-21 RX ORDER — ATORVASTATIN CALCIUM 80 MG/1
80 TABLET, FILM COATED ORAL DAILY
Status: DISCONTINUED | OUTPATIENT
Start: 2023-08-22 | End: 2023-08-23

## 2023-08-21 RX ORDER — ASPIRIN 81 MG/1
81 TABLET ORAL DAILY
Status: DISCONTINUED | OUTPATIENT
Start: 2023-08-22 | End: 2023-08-23

## 2023-08-21 NOTE — PLAN OF CARE
Assumed care of patient when transferred from ED. Neuro assessments per order, no new deficits noted. Reports numbness to lips and bilat hands, left greater than right hand. Reports symptoms worsen with standing and starts to slur occasional word. States symptoms improve once laying back down. Lumbar puncture completed, site with bandaid C/D/I. Patient updated on POC. Problem: Patient/Family Goals  Goal: Patient/Family Long Term Goal  Description: Patient's Long Term Goal: Neurological symptoms resolve or improve    Interventions:  - Monitor vital signs, lab and diagnostic results  - Neurology and PT/OT consulted  - Follow-up appointments scheduled   - Prescriptions prescribed for discharge  - See additional Care Plan goals for specific interventions  Outcome: Progressing  Goal: Patient/Family Short Term Goal  Description: Patient's Short Term Goal: Discharge home    Interventions:   - Monitor vital signs, lab and diagnostic test results  - Consult Neurology and PT/OT  - Prescriptions prescribed for discharge  - Follow-up appointments scheduled.   - See additional Care Plan goals for specific interventions  Outcome: Progressing     Problem: METABOLIC/FLUID AND ELECTROLYTES - ADULT  Goal: Glucose maintained within prescribed range  Description: INTERVENTIONS:  - Monitor Blood Glucose as ordered  - Assess for signs and symptoms of hyperglycemia and hypoglycemia  - Administer ordered medications to maintain glucose within target range  - Assess barriers to adequate nutritional intake and initiate nutrition consult as needed  - Instruct patient on self management of diabetes  Outcome: Progressing  Goal: Electrolytes maintained within normal limits  Description: INTERVENTIONS:  - Monitor labs and rhythm and assess patient for signs and symptoms of electrolyte imbalances  - Administer electrolyte replacement as ordered  - Monitor response to electrolyte replacements, including rhythm and repeat lab results as appropriate  - Fluid restriction as ordered  - Instruct patient on fluid and nutrition restrictions as appropriate  Outcome: Progressing     Problem: NEUROLOGICAL - ADULT  Goal: Achieves stable or improved neurological status  Description: INTERVENTIONS  - Assess for and report changes in neurological status  - Initiate measures to prevent increased intracranial pressure  - Maintain blood pressure and fluid volume within ordered parameters to optimize cerebral perfusion and minimize risk of hemorrhage  - Monitor temperature, glucose, and sodium. Initiate appropriate interventions as ordered  Outcome: Progressing  Goal: Achieves maximal functionality and self care  Description: INTERVENTIONS  - Monitor swallowing and airway patency with patient fatigue and changes in neurological status  - Encourage and assist patient to increase activity and self care with guidance from PT/OT  - Encourage visually impaired, hearing impaired and aphasic patients to use assistive/communication devices  - Encourage patient to take time when using hands and mouth. To be aware of temperature of items before touching and eating and drinking.   Outcome: Progressing     Problem: Impaired Swallowing  Goal: Minimize aspiration risk  Description: Interventions:  - Patient should be alert and upright for all feedings (90 degrees preferred)  - Offer food and liquids at a slow rate  - No straws  - Encourage small bites of food and small sips of liquid  - Offer pills one at a time, crush or deliver with applesauce as needed  - Discontinue feeding and notify MD (or speech pathologist) if coughing or persistent throat clearing or wet/gurgly vocal quality is noted  Outcome: Progressing     Problem: Impaired Swallowing  Goal: Minimize aspiration risk  Description: Interventions:  - Patient should be alert and upright for all feedings (90 degrees preferred)  - Offer food and liquids at a slow rate  - No straws  - Encourage small bites of food and small sips of liquid  - Offer pills one at a time, crush or deliver with applesauce as needed  - Discontinue feeding and notify MD (or speech pathologist) if coughing or persistent throat clearing or wet/gurgly vocal quality is noted  Outcome: Progressing

## 2023-08-21 NOTE — PLAN OF CARE
Neurology Update:    Plan to increase Lipitor to 80mg at night  Asa 81mg daily   Plavix 75mg daily    Based on  and atherosclerosis in intracranial arteries    GIRISH Ellington   [facility]

## 2023-08-21 NOTE — PROCEDURES
[facility]  Nerve Conduction & EMG Report                      Fior Liborio, [address]   EMG department   [address], [address] [place] [address]  [phone]          Patient name: Ayaz Stafford  YOB: 1970  Reason for study: Evaluate for diffuse polyneuropathy or neuromuscular junction disorder  Brief history: 45year old male with two weak history of intermittent bilateral lower extremity weakness and slurred speech and oral paresthesias that are triggered mainly with standing. Sensory and motor nerve conduction studies, and needle EMG:  Please see separate sheet for waveforms and quantitative nerve conduction data, as well as for tabulated form of electromyographic data. Summary:   The left sural sensory response was normal.  The right sural sensory response had decreased amplitude. The right ulnar and radial sensory responses were normal.  The right median sensory response had prolonged distal latency. The right median motor response had prolonged distal latency. The right ulnar motor response was normal.  The bilateral tibial motor responses were normal.  The bilateral peroneal motor responses had borderline decreased amplitudes. Repetitive nerve stimulation showed no decrement in the right APB or nasalis. Needle EMG was performed on selected muscles of the right upper and lower extremities. There was mild motor unit remodeling seen in the right FDI and right tibialis anterior. Conclusion:  There is no electrophysiologic evidence of a neuromuscular junction process on this study. There is no electrophysiologic evidence of a polyneuropathy or myopathy. There is a right compression median mononeuropathy at the wrist, carpal tunnel. There is mild motor unit remodeling seen in the right tibialis anterior, which in the appropriate clinical setting, can be seen in a chronic L5 radiculopathy.        Fior Capone, DO  Neurology and Neuromuscular medicine  [facility]

## 2023-08-21 NOTE — PROGRESS NOTES
Patient was seen and additional stroke information/education was provided. All pertinent questions and concerns were addressed.

## 2023-08-21 NOTE — PROGRESS NOTES
Assumed pt care @ 1930   Pt A/Ox4, NSR on tele, & on RA   Q4H neuro's in place; no new deficits noted   Qshift orthostatics completed; see down below   Pt denies pain   Comfort & safety measures in place   Pt updated on plan of care          08/21/23 0518 08/21/23 0520 08/21/23 0521   Vital Signs   /72 119/63 123/66   MAP (mmHg) 81 72 71

## 2023-08-21 NOTE — PROGRESS NOTES
Orthostatic BP results. Patient reports lips tingling when standing, resolved right after.           08/21/23 1700 08/21/23 1702 08/21/23 1704   Vital Signs   /71 118/65 108/54   MAP (mmHg) 84 76 62   BP Location Right arm Right arm Right arm   BP Method Automatic Automatic Automatic   Patient Position Lying Sitting Standing

## 2023-08-22 ENCOUNTER — APPOINTMENT (OUTPATIENT)
Dept: MRI IMAGING | Facility: HOSPITAL | Age: 53
End: 2023-08-22
Attending: NURSE PRACTITIONER
Payer: COMMERCIAL

## 2023-08-22 LAB
ACTH: 74.9 PG/ML
GLUCOSE BLD-MCNC: 117 MG/DL (ref 70–99)
GLUCOSE BLD-MCNC: 165 MG/DL (ref 70–99)
GLUCOSE BLD-MCNC: 180 MG/DL (ref 70–99)
GLUCOSE BLD-MCNC: 192 MG/DL (ref 70–99)

## 2023-08-22 PROCEDURE — 70553 MRI BRAIN STEM W/O & W/DYE: CPT | Performed by: NURSE PRACTITIONER

## 2023-08-22 PROCEDURE — 99232 SBSQ HOSP IP/OBS MODERATE 35: CPT | Performed by: OTHER

## 2023-08-22 RX ORDER — INSULIN GLARGINE 300 U/ML
INJECTION, SOLUTION SUBCUTANEOUS
Qty: 45 ML | Refills: 0 | Status: SHIPPED | OUTPATIENT
Start: 2023-08-22

## 2023-08-22 RX ORDER — LORAZEPAM 2 MG/ML
1 INJECTION INTRAMUSCULAR
Status: COMPLETED | OUTPATIENT
Start: 2023-08-22 | End: 2023-08-22

## 2023-08-22 RX ORDER — GADOTERATE MEGLUMINE 376.9 MG/ML
20 INJECTION INTRAVENOUS
Status: COMPLETED | OUTPATIENT
Start: 2023-08-22 | End: 2023-08-22

## 2023-08-22 RX ORDER — ATORVASTATIN CALCIUM 80 MG/1
80 TABLET, FILM COATED ORAL DAILY
Qty: 30 TABLET | Refills: 2 | Status: SHIPPED | OUTPATIENT
Start: 2023-08-23

## 2023-08-22 RX ORDER — CLOPIDOGREL BISULFATE 75 MG/1
75 TABLET ORAL DAILY
Qty: 30 TABLET | Refills: 2 | Status: SHIPPED | OUTPATIENT
Start: 2023-08-23

## 2023-08-22 RX ORDER — ASPIRIN 81 MG/1
81 TABLET ORAL DAILY
Qty: 30 TABLET | Refills: 2 | Status: SHIPPED | OUTPATIENT
Start: 2023-08-23

## 2023-08-22 NOTE — PLAN OF CARE
Assumed care of patient at 07:30 am  Neuro assessments per order, no new deficits noted. Lumbar puncture site with bandaid C/D/I. MRI completed prior to shift. Patient updated on POC. Problem: Patient/Family Goals  Goal: Patient/Family Long Term Goal  Description: Patient's Long Term Goal: Neurological symptoms resolve or improve    Interventions:  - Monitor vital signs, lab and diagnostic results  - Neurology and PT/OT consulted  - Follow-up appointments scheduled   - Prescriptions prescribed for discharge  - See additional Care Plan goals for specific interventions  Outcome: Progressing  Goal: Patient/Family Short Term Goal  Description: Patient's Short Term Goal: Discharge home    Interventions:   - Monitor vital signs, lab and diagnostic test results  - Consult Neurology and PT/OT  - Prescriptions prescribed for discharge  - Follow-up appointments scheduled.   - See additional Care Plan goals for specific interventions  Outcome: Progressing     Problem: METABOLIC/FLUID AND ELECTROLYTES - ADULT  Goal: Glucose maintained within prescribed range  Description: INTERVENTIONS:  - Monitor Blood Glucose as ordered  - Assess for signs and symptoms of hyperglycemia and hypoglycemia  - Administer ordered medications to maintain glucose within target range  - Assess barriers to adequate nutritional intake and initiate nutrition consult as needed  - Instruct patient on self management of diabetes  Outcome: Progressing  Goal: Electrolytes maintained within normal limits  Description: INTERVENTIONS:  - Monitor labs and rhythm and assess patient for signs and symptoms of electrolyte imbalances  - Administer electrolyte replacement as ordered  - Monitor response to electrolyte replacements, including rhythm and repeat lab results as appropriate  - Fluid restriction as ordered  - Instruct patient on fluid and nutrition restrictions as appropriate  Outcome: Progressing     Problem: NEUROLOGICAL - ADULT  Goal: Achieves stable or improved neurological status  Description: INTERVENTIONS  - Assess for and report changes in neurological status  - Initiate measures to prevent increased intracranial pressure  - Maintain blood pressure and fluid volume within ordered parameters to optimize cerebral perfusion and minimize risk of hemorrhage  - Monitor temperature, glucose, and sodium. Initiate appropriate interventions as ordered  Outcome: Progressing  Goal: Achieves maximal functionality and self care  Description: INTERVENTIONS  - Monitor swallowing and airway patency with patient fatigue and changes in neurological status  - Encourage and assist patient to increase activity and self care with guidance from PT/OT  - Encourage visually impaired, hearing impaired and aphasic patients to use assistive/communication devices  - Encourage patient to take time when using hands and mouth. To be aware of temperature of items before touching and eating and drinking.   Outcome: Progressing     Problem: Impaired Swallowing  Goal: Minimize aspiration risk  Description: Interventions:  - Patient should be alert and upright for all feedings (90 degrees preferred)  - Offer food and liquids at a slow rate  - No straws  - Encourage small bites of food and small sips of liquid  - Offer pills one at a time, crush or deliver with applesauce as needed  - Discontinue feeding and notify MD (or speech pathologist) if coughing or persistent throat clearing or wet/gurgly vocal quality is noted  Outcome: Progressing     Problem: Impaired Communication  Goal: Patient will achieve maximal communication potential  Description: Interventions:  Outcome: Progressing

## 2023-08-22 NOTE — DISCHARGE SUMMARY
General Medicine Discharge Summary     Patient ID:  Sudha Rush  45year old  12/7/1970    Admit date: 8/19/2023    Discharge date and time: 8/22/2023    Attending Physician: Eduarda Lal MD     Primary Care Physician: Barbi Marquez MD     Discharge Diagnoses:     Acute lacunar infarct L thalamus  Uncontrolled insulin dep DM2  Hypothyroidism  Orthostatic hypotension, suspected autonomic dysfunction      Primary Diagnosis at discharge from Hospital: Other: acute CVA; still recommend for TCM follow-up    Please note that only IHP DMG and EMG patients enrolled in the Medicare ACO, [facility] ACO and [address] [place] [address] will be handled by the [address] [place], [place] [address]S [facility] Management team.  For all other patients, please follow usual protocol for discharge care transition. Secondary Diagnoses: None    Risk of readmission: Sudha Rush has Moderate Risk of readmission after discharge from the hospital.    Discharge Condition: stable    Disposition: {home    Important Follow up:  - PCP within   - Consults:     Desmond Vallejo [address]  [address] [address]    Schedule an appointment as soon as possible for a visit in 1 month(s)      - Labs: none  - Radiology: none    Hospital Course:        45year old male with history of type 2 DM, gerd, hld, HTN, hypothyroidism presenting with leg weakness, loss of feeling in his hands, slurred speech and lip numbness. Leg Weakness  Arm Weakness  Slurred Speech  Lip numbness  -neurology following  -ct brain neg for acute pathology  -MRI Brain --> acute lacunar infarction in left thalamus  -CTA brain --> moderate segmental stenosis of L P2 of posterior cerebral artery, mod stensosis carotid siphons  -LP completed 8/21                 - protein elevated 52.9, glucose elevated 94. Possible GBS? Neuro thinks less likely.   LP labs pending.    -check 2d echo with bubble - no shunt   - asa, statin, plavix for dc    Intermittent orthostatic hypotension  - suspect secondary to autonomic dysfunction from uncontrolled DM2  - GER hose, proper hydration, DM control  - cortisol > 18 essentially excludes adrenal insuficiency     Type 2 DM  -hold home oral meds  - I rxed levemir for dc which he has not been taking for months  -premeal insulin  -low dose insulin sliding scale     Hypothyroidism  -levothyroxine     HLD  -atorvastatin      HTN  -sbp stable  -hold lisinopril on dc for now, will need to be re-evaluated as OP      Consults: IP CONSULT TO NEUROLOGY  IP CONSULT TO HOSPITALIST  IP CONSULT TO SOCIAL WORK    Operative Procedures:        Patient instructions:      Current Discharge Medication List    START taking these medications    aspirin 81 MG Oral Tab EC  Take 1 tablet (81 mg total) by mouth daily. clopidogrel 75 MG Oral Tab  Take 1 tablet (75 mg total) by mouth daily. CONTINUE these medications which have CHANGED    atorvastatin 80 MG Oral Tab  Take 1 tablet (80 mg total) by mouth daily. Insulin Glargine, 1 Unit Dial, (TOUJEO SOLOSTAR) 300 UNIT/ML Subcutaneous Solution Pen-injector  INJECT UP TO 20 units nightly      CONTINUE these medications which have NOT CHANGED    dapagliflozin (FARXIGA) 10 MG Oral Tab  Take 1 tablet (10 mg total) by mouth daily. BD PEN NEEDLE SHORT U/F 31G X 8 MM Does not apply Misc  Use 4 times daily    METFORMIN HCL  MG Oral Tablet 24 Hr  TAKE 2 TABLETS(1000 MG) BY MOUTH TWICE DAILY WITH MEALS    Continuous Blood Gluc Sensor (FREESTYLE NATE 14 DAY SENSOR) Does not apply Misc  1 each every 14 (fourteen) days. lisinopril 10 MG Oral Tab  Take 1 tablet (10 mg total) by mouth daily.     levothyroxine 150 MCG Oral Tab  TAKE ONE TABLET BY MOUTH ONCE DAILY    Insulin Aspart Pen (NOVOLOG FLEXPEN) 100 UNIT/ML Subcutaneous Solution Pen-injector  INJECT 10-15 UNITS 3 TIMES PER DAY WITH MEALS AS DIRECTED    Continuous Blood Gluc  (FREESTYLE NATE 14 DAY READER) Does not apply Device  1 each by Does not apply route as needed. Glucose Blood (TRUE METRIX BLOOD GLUCOSE TEST) In Vitro Strip  Test blood sugar before every meal and at bedtime (5-6 times per day)      STOP taking these medications    celecoxib 200 MG Oral Cap    ASPIRIN 325 MG OR TABS          I PERSONALLY RECONCILED CURRENT AND DISCHARGE MEDICATIONS ON DAY OF DISCHARGE      Activity: activity as tolerated  Diet: diabetic diet  Wound Care: as directed  Code Status: No Order      Exam on day of discharge:      08/22/23  1123   BP: 113/73   Pulse: 93   Resp: 22   Temp:        General: no acute distress, alert and oriented x 3  Heart: RRR  Lungs: clear bilaterally, no active wheezing  Abdomen: nontender, nondistended, intact BS  Extremities: no pedal edema   Neuro: CN inact, no focal deficits      Total time coordinating care for discharge: Greater than 30 minutes    . Selvin Calvillo  [facility] Hospitalist  [phone]

## 2023-08-22 NOTE — PROGRESS NOTES
Orthostatic BP results       08/22/23 1118 08/22/23 1120 08/22/23 1121   Vital Signs   /75 122/74  --    MAP (mmHg) 96 80   (time error)   BP Location Right arm Right arm  --    BP Method Automatic Automatic  --    Patient Position Lying Sitting  --       08/22/23 1123   Vital Signs   /73   MAP (mmHg) 80   BP Location Right arm   BP Method Automatic   Patient Position Standing

## 2023-08-22 NOTE — SLP NOTE
SPEECH/LANGUAGE/COGNITIVE EVALUATION - INPATIENT    Admission Date: 8/19/2023  Evaluation Date: 08/22/23    Reason for Referral: Stroke protocol    ASSESSMENT & PLAN   ASSESSMENT & IMPRESSION  Patient seen for follow-up as per protocol. Patient reported increased weakness, unsteadiness, and numbness to radha-oral area when he gets up and sometimes after ambulating. Nothing noted at rest. Initial MRI revealed acute lacunar infarct in L thalamus. Neurology following and ordered MRI brain with and without contrast. Patient independent at baseline. Upon further history from patient, he reported behaviors of repetitive nature, hyperverbose, and difficult to redirect. Patient reported no hx of neuropsych or psych testing. Patient reported when he can get his adrenaline going and \"forces anger,\" he can get his brain to do what he wants. Assessment(s) Administered:  (Informal)   Portions of the [facility] REHABILITATION were administered for cognitive linguistic evaluation. Patient achieved 15/16. Patient spontaneous speech is fluent without word finding or paraphasic errors. No evidence of motor speech impairment as patient exhibited 100% speech intelligibility across contexts. Patient reported he \"stutters\" when he starts feeling numbness around his lips but not at this time during this session. Patient demonstrated 100% acc in clock design task as well as mental calculations. Patient recalled 5/5 unrelated words after filled delay without difficulty. Patient exhibited functional communication however recommend outpatient neuropsychological testing based on reported behavioral history. No skilled SLP intervention or needs identified at this time.       Aphasic Depression Rating Scale Administered: Not applicable  Deficits Identified:  (Refer to neuropsychology)    Discharge Recommendations/Plan: Home    Patient Experiencing Pain: No      GOALS  Goal #1 Complete cognitive communication evaluation as per stroke protocol Met     Prior Living Situation: Home with spouse  Prior Level of Function: Independent      Patient/Family Goals: cause for intermittent parasthesias and weakness    Interdisciplinary Communication: Discussed with RN    Patient, family and/or caregiver has been informed and has taken part in this evaluation and plan of treatment and have been advised and agree on the findings and goals.       FOLLOW UP  Treatment Plan/Recommendations:  (Patient would benefit from neuropsychological evaluation)     Follow Up Needed (Documentation Required): No      Thank you for your referral.  If you have any questions please contact Augusta Ivan, [id number] [facility] [place], [place] CCC-SLP/L, pager [phone]  Speech-Language Pathologist

## 2023-08-22 NOTE — PROGRESS NOTES
Assumed pt care @ 1930   Pt A/Ox4, NSR on tele, & on RA   Q4H neuros in place; no new deficits noted   IVFs infusing per order   Qshift orthostatics completed, see down below   Pt denies pain   Comfort & safety precautions in place   Pt updated on plan of care        08/22/23 0500 08/22/23 0501 08/22/23 0503   Vital Signs   /79 100/70 (!) 119/97   MAP (mmHg)  --  77 102   BP Location Right arm Right arm Right arm   BP Method Automatic Automatic Automatic   Patient Position Lying Sitting Standing

## 2023-08-23 VITALS
OXYGEN SATURATION: 97 % | BODY MASS INDEX: 31 KG/M2 | WEIGHT: 202.81 LBS | TEMPERATURE: 98 F | HEART RATE: 97 BPM | DIASTOLIC BLOOD PRESSURE: 73 MMHG | SYSTOLIC BLOOD PRESSURE: 123 MMHG | RESPIRATION RATE: 19 BRPM

## 2023-08-23 LAB
ACHR BINDING ABS: 0.04 NMOL/L
GLUCOSE BLD-MCNC: 149 MG/DL (ref 70–99)
NON GYNE INTERPRETATION: NEGATIVE
VITAMIN B1 WHOLE BLD: 214 NMOL/L

## 2023-08-23 PROCEDURE — 99233 SBSQ HOSP IP/OBS HIGH 50: CPT | Performed by: OTHER

## 2023-08-23 NOTE — PLAN OF CARE
Received pt at shift change. Pt axox4. Pt able to walk to BRP, tolerated his food. Wife at bedside, all questions and concerns addressed, support given, will monitor. NURSING DISCHARGE NOTE    Discharged Home via Wheelchair. Accompanied by Support staff  Belongings Taken by patient/family.

## 2023-08-23 NOTE — PLAN OF CARE
Assumed care around 1930  No neuro deficits noted. Pt reports BLE weakness and BUE numbness when standing for long periods  Spouse at bedside.  Updated on Vene [address] for possible dc home tomorrow

## 2023-08-23 NOTE — PROGRESS NOTES
Orthostatic BP - pt reports circumoral numbness. Dr. Moore Comp updated.  IVF bolus given and continuous fluids restarted     08/22/23 2051 08/22/23 2054 08/22/23 2100   Vital Signs   Pulse 90 81 74   Heart Rate Source Monitor  --   --    Resp 18  --   --    Respiratory Quality Normal  --   --    /79 100/84 116/54   MAP (mmHg) 94 87 67   BP Location Right arm Right arm Right arm   BP Method Automatic Automatic Automatic   Patient Position Lying Sitting Standing

## 2023-08-24 LAB — RENIN ACTIVITY: 1.01 NG/ML/HR

## 2023-08-24 NOTE — PAYOR COMM NOTE
--------------  DISCHARGE REVIEW    [facility]  Subscriber #:  [id number]  Authorization Number: [id number]    Admit date: 8/19/23  Admit time:   1:25 PM  Discharge Date: 8/23/2023  1:49 PM     Admitting Physician: Veronique Amato MD  Attending Physician:  Caro att. providers found  Primary Care Physician: Augustine Hogan MD          Discharge Summary Notes        Discharge Summary signed by Arcenio Corey MD at 8/23/2023 11:53 AM       Author: Arcenio Corey MD Specialty: HOSPITALIST, Internal Medicine Author Type: Physician    Filed: 8/23/2023 11:53 AM Date of Service: 8/23/2023 11:53 AM Status: Addendum    : Arcenio Corey MD (Physician)    Related Notes: Original Note by Arcenio Corey MD (Physician) filed at 8/22/2023 12:28 PM                                                              General Medicine Discharge Summary     Patient ID:  Onel Rubi  45year old  12/7/1970    Admit date: 8/19/2023    Discharge date and time: 8/22/2023    Attending Physician: Arcenio Corey MD     Primary Care Physician: Ghada Cody MD     Discharge Diagnoses:     Acute lacunar infarct L thalamus  Uncontrolled insulin dep DM2  Hypothyroidism  Orthostatic hypotension, suspected autonomic dysfunction      Primary Diagnosis at discharge from Hospital: Other: acute CVA; still recommend for TCM follow-up    Please note that only IHP DMG and EMG patients enrolled in the Medicare ACO, [address] will be handled by the [address] [place] [address] Management team.  For all other patients, please follow usual protocol for discharge care transition.     Secondary Diagnoses: None    Risk of readmission: Onel Rubi has Moderate Risk of readmission after discharge from the hospital.    Discharge Condition: stable    Disposition: {home    Important Follow up:  - PCP within   - Consults:     Desmond Teixeira [address]  [address] [facility] [address] Bonner [address]    Schedule an appointment as soon as possible for a visit in 1 month(s)      - Labs: none  - Radiology: none    Hospital Course:        45year old male with history of type 2 DM, gerd, hld, HTN, hypothyroidism presenting with leg weakness, loss of feeling in his hands, slurred speech and lip numbness. Leg Weakness  Arm Weakness  Slurred Speech  Lip numbness  -neurology following  -ct brain neg for acute pathology  -MRI Brain --> acute lacunar infarction in left thalamus  -CTA brain --> moderate segmental stenosis of L P2 of posterior cerebral artery, mod stensosis carotid siphons  -LP completed 8/21                 - protein elevated 52.9, glucose elevated 94. Possible GBS? Neuro thinks less likely. LP labs pending.    -check 2d echo with bubble - no shunt   - asa, statin, plavix for dc    Intermittent orthostatic hypotension  - suspect secondary to autonomic dysfunction from uncontrolled DM2  - GER hose, proper hydration, DM control  - cortisol > 18 essentially excludes adrenal insuficiency     Type 2 DM  -hold home oral meds  - I rxed levemir for dc which he has not been taking for months  -premeal insulin  -low dose insulin sliding scale     Hypothyroidism  -levothyroxine     HLD  -atorvastatin      HTN  -sbp stable  -hold lisinopril on dc for now, will need to be re-evaluated as OP      Consults: IP CONSULT TO NEUROLOGY  IP CONSULT TO HOSPITALIST  IP CONSULT TO SOCIAL WORK    Operative Procedures:        Patient instructions:      Current Discharge Medication List    START taking these medications    aspirin 81 MG Oral Tab EC  Take 1 tablet (81 mg total) by mouth daily. clopidogrel 75 MG Oral Tab  Take 1 tablet (75 mg total) by mouth daily. CONTINUE these medications which have CHANGED    atorvastatin 80 MG Oral Tab  Take 1 tablet (80 mg total) by mouth daily.     Insulin Glargine, 1 Unit Dial, (TOUJEO SOLOSTAR) 300 UNIT/ML Subcutaneous Solution Pen-injector  INJECT UP TO 20 units nightly      CONTINUE these medications which have NOT CHANGED    dapagliflozin (FARXIGA) 10 MG Oral Tab  Take 1 tablet (10 mg total) by mouth daily. BD PEN NEEDLE SHORT U/F 31G X 8 MM Does not apply Misc  Use 4 times daily    METFORMIN HCL  MG Oral Tablet 24 Hr  TAKE 2 TABLETS(1000 MG) BY MOUTH TWICE DAILY WITH MEALS    Continuous Blood Gluc Sensor (FREESTYLE NATE 14 DAY SENSOR) Does not apply Misc  1 each every 14 (fourteen) days. lisinopril 10 MG Oral Tab  Take 1 tablet (10 mg total) by mouth daily. levothyroxine 150 MCG Oral Tab  TAKE ONE TABLET BY MOUTH ONCE DAILY    Insulin Aspart Pen (NOVOLOG FLEXPEN) 100 UNIT/ML Subcutaneous Solution Pen-injector  INJECT 10-15 UNITS 3 TIMES PER DAY WITH MEALS AS DIRECTED    Continuous Blood Gluc  (FREESTYLE NATE 14 DAY READER) Does not apply Device  1 each by Does not apply route as needed. Glucose Blood (TRUE METRIX BLOOD GLUCOSE TEST) In Vitro Strip  Test blood sugar before every meal and at bedtime (5-6 times per day)      STOP taking these medications    celecoxib 200 MG Oral Cap    ASPIRIN 325 MG OR TABS          I PERSONALLY RECONCILED CURRENT AND DISCHARGE MEDICATIONS ON DAY OF DISCHARGE      Activity: activity as tolerated  Diet: diabetic diet  Wound Care: as directed  Code Status: No Order      Exam on day of discharge:      08/22/23  1123   BP: 113/73   Pulse: 93   Resp: 22   Temp:        General: no acute distress, alert and oriented x 3  Heart: RRR  Lungs: clear bilaterally, no active wheezing  Abdomen: nontender, nondistended, intact BS  Extremities: no pedal edema   Neuro: CN inact, no focal deficits      Total time coordinating care for discharge: Greater than 30 minutes    . Jose Armando Shipley  [facility] Hospitalist  [phone]      Electronically signed by Angelina Valdes MD on 8/23/2023 11:53 AM         REVIEWER COMMENTS

## 2023-08-25 LAB
ACHR BLOCKING ABS: 14 %
ALDOSTERONE: 6.5 NG/DL

## 2023-08-26 LAB
GD1B ANTIBODY, IGG: 6 IV
GD1B ANTIBODY, IGM: 3 IV
GM1 ANTIBODY, IGG: 4 IV
GM1 ANTIBODY, IGM: 3 IV
GQ1B ANTIBODY, IGG: 10 IV
GQ1B ANTIBODY, IGM: 3 IV

## 2023-08-29 LAB — MUSK ABS, SERUM: <1 U/ML

## 2023-08-30 ENCOUNTER — TELEPHONE (OUTPATIENT)
Dept: MEDSURG UNIT | Facility: HOSPITAL | Age: 53
End: 2023-08-30

## 2023-09-25 ENCOUNTER — TELEPHONE (OUTPATIENT)
Dept: NEUROLOGY | Facility: CLINIC | Age: 53
End: 2023-09-25

## 2023-09-25 NOTE — TELEPHONE ENCOUNTER
Reached out to pt LMTCB to r/s missed appt. Pt missed 9/22/2023 appt. If pt calls back please assist in r/s missed appt.

## 2023-10-04 ENCOUNTER — TELEPHONE (OUTPATIENT)
Dept: NEUROLOGY | Facility: CLINIC | Age: 53
End: 2023-10-04

## 2023-10-04 ENCOUNTER — OFFICE VISIT (OUTPATIENT)
Dept: NEUROLOGY | Facility: CLINIC | Age: 53
End: 2023-10-04
Payer: COMMERCIAL

## 2023-10-04 VITALS — BODY MASS INDEX: 31 KG/M2 | WEIGHT: 206.63 LBS

## 2023-10-04 DIAGNOSIS — R20.2 PARESTHESIAS: ICD-10-CM

## 2023-10-04 DIAGNOSIS — R44.8 SENSORY IMPAIRMENT: ICD-10-CM

## 2023-10-04 DIAGNOSIS — I95.1 ORTHOSTATIC INTOLERANCE: ICD-10-CM

## 2023-10-04 DIAGNOSIS — I63.81 THALAMIC STROKE (HCC): Primary | ICD-10-CM

## 2023-10-04 PROCEDURE — 99215 OFFICE O/P EST HI 40 MIN: CPT | Performed by: STUDENT IN AN ORGANIZED HEALTH CARE EDUCATION/TRAINING PROGRAM

## 2023-10-04 RX ORDER — ASPIRIN 81 MG/1
81 TABLET ORAL DAILY
COMMUNITY
Start: 2023-10-04

## 2023-10-04 RX ORDER — CLOPIDOGREL BISULFATE 75 MG/1
75 TABLET ORAL DAILY
Qty: 30 TABLET | Refills: 5 | Status: SHIPPED | OUTPATIENT
Start: 2023-10-04

## 2023-10-04 NOTE — TELEPHONE ENCOUNTER
Patient seen in office today by Dr. Gabo Dupont. She is recommending that patient get a skin punch biopsy, to be done by Dr. Iveth Brown. Patient given Patient Information brochure from [address] [place] [address] and office informational letter regarding test coverage while in office. Kit already available in office. Labeled with patient's name. Routing to Dr. Iveth Brown to see where patient should be added to schedule.

## 2023-10-04 NOTE — PROGRESS NOTES
Pt states here for ED follow up from 08/19/23 for stroke follow up. Pt states still feeling facial numbness centered in the chin. Pt states vision and speech is well. Speech may get bad when stressed. Pt has to have hand surgery and was told to stop blood thinner. Was not sure what to do.

## 2023-10-04 NOTE — H&P
Neurology New Office Visit    Grayson Schmitt   Date of Birth 12/7/1970    Subjective:  Grayson Schmitt is a(n) 45year old male with a medical history of diabetes mellitus type 2, dyslipidemia, hypothyroidism, recent stroke, and recent diagnosis fo right carpal tunnel syndrome who presents for hospital follow up for stroke and positional neurologic symptoms . Per Dr Cecelia Larry note \"Intermittent predominantly lower extremities giving out with intermittent dysarthria and predominantly lip paresthesias Per report these symptoms are very positional, only occur if stands up for a few minutes. Initial fall occurred with right LE giving out, but then entire body felt weak. EMG negative for polyneuropathy, repetitive nerve stimulatin negative for NMJ process AchR ab pending Given the strictly positional nature of these symptoms, and normal neurologic exam, would consider cortisol/adrenal/BP etiology Check orthostatics Testing for Kane's disease? AM cortisol? Patient has family history of [place]'s If initial testing for this negative, would obtain CSF testing PT/OT. Acute left thalamic stroke and moderate left PCA stenosis. Does not account for above symptoms. Uncontrolled vascular risk factors A1c 8.6,   Add Plavix to ASA. Start statin. Monitor BP and consider hemodynamic PCA stenosis etiology if BP dropped during ischemic event. Diabetic control. Right before stroke, fell when walking off deck, right leg gave out. Patient reports that currently, every so often when he's standing, he finds himself going to sit down, legs will get heavy in calf. Feels like gets a bit weak, hard to catch breath, some numbness at chin. He's had a few times where his right leg will lag behind since stroke. Has had left leg not respond when he is trying to walk, after sitting for a bit. Iniitlaly noted symptoms with standing legs felt rubbery (weakness).  Blood sugars seem a bit elevated (was not on normal diabetes regimen and was eating a lot of fast food at the time). Happening a few weeks before admitted for stroke. Recalls that glucoses were pretty incoordinated. He recalls couldn't brush teeth with right hand after waking up feels related to coordination troubles with hand, numbness in hands, and face, speech stuttering (couldn't get words out). He does not feel these are getting worse. Hasn't taken blood pressure medication since stroke. Scheduled for right trigger finger surgery. Neurologic review of systems:  -Fatigue: sleeping a few hours per day after stroke  -Language: expressive aphasia period, slow speech, on and off for estimated for 1.5 hours. Then back to normal.   -Vision: slight blurry vision the night before he went to hospital felt like had a blinder on an eye couldn't see that well worse on right eye, no diplopia  -Dysphagia/dysarthria: no/no  -Motor: leg weakness with prolonged walking after 5-10 minutes, sometimes focal either side, legs feel rubbery. Legs intermittently give out (could be either leg), lasts brief few seconds (sits down and improves)- can be provoked after sitting for a long period of time leg not responding as fast (no associated numbness, pins and needles, radiating pains). -Sensory: numbness and tingling started around time of stroke, lasts estimated minutes, ongoing since stroke intermittently when really exerting self  -Ambulation: no recent falls, some wobbliness when walking towards door  -Bowel: no  -Bladder no  -Sweat: thinks sweats a bit more than normal (worse with diabetes), in feet and arms  -Radiating pains: no  -Dry eye, dry mouth: not unless dehydrated  -Reports some early satiety (since stroke)  When he stands too long: feels lightheaded and winded    Has history of back injury after picking up puppy. After that had some issues of left leg after standing up, ongoing for a few weeks, then self resolved.  Had some loss of feeling in left hand after back injury which self resolved. He reports snoring, he does feel well rested in the AM.     Problem List:  Patient Active Problem List:     Esophageal reflux     Proteinuria     Pure hypercholesterolemia     Type 2 diabetes mellitus with hyperglycemia, with long-term current use of insulin (HCC)     Hypothyroidism, adult     Dyslipidemia with low high density lipoprotein (HDL) cholesterol with hypertriglyceridemia due to type 2 diabetes mellitus      Essential hypertension     Hashimoto's thyroiditis     Uncontrolled type 2 diabetes mellitus with both eyes affected by moderate nonproliferative retinopathy without macular edema     Erectile dysfunction, unspecified erectile dysfunction type     BMI 32.0-32.9,adult     Weakness generalized     Slurred speech     Weakness     Cerebrovascular accident (CVA) ([facility] [address]      PMHx:  Past Medical History:   Diagnosis Date    Diabetes ([facility] [address]     GERD     HYPERLIPIDEMIA     Unspecified hypothyroidism        PSHx:  Past Surgical History:   Procedure Laterality Date    IMPACT TOOTH REM BONY W/COMP  1993    wisdom teeth       SocHx:  Social History     Socioeconomic History    Marital status:     Number of children: 4    Years of education: 15   Occupational History    Occupation: [employer]     Comment: Marketing   Tobacco Use    Smoking status: Never    Smokeless tobacco: Never   Vaping Use    Vaping Use: Never used   Substance and Sexual Activity    Alcohol use: Yes     Alcohol/week: 0.0 standard drinks of alcohol    Drug use: No    Sexual activity: Yes     Partners: Female   Other Topics Concern     Service Yes    Blood Transfusions No    Caffeine Concern Yes     Comment: ice tea    Occupational Exposure No    Hobby Hazards No    Sleep Concern No    Stress Concern Yes    Weight Concern No    Special Diet No    Back Care No    Exercise No    Bike Helmet No    Seat Belt Yes    Self-Exams No   Social History Narrative    Lives with wife and children   No alcohol, tobacco, rec drugs. Family History:  Family History   Problem Relation Age of Onset    Mental Disorder Father     Psychiatric Father     Diabetes Mother     Neurological Disorder Mother         dementia    Heart Disease Paternal Grandfather     Heart Disease Sister     Heart Attack Sister     Heart Surgery Sister         PCI    Thyroid Disorder Sister     Hypertension Sister     Lipids Sister     Obesity Sister     Diabetes Sister     Substance Abuse Sister     Hypertension Brother     Alcohol and Other Disorders Associated Brother     Diabetes Brother     Anxiety Daughter     Psychiatric Daughter         Aspergers    Personality Disorder Daughter     Diabetes Sister     Seizure Disorder Sister         Epilepsy    Hypertension Brother    Half-sister, scoliosis, epilepsy, polysubstance use,  in early 45s in setting of stroke, choked on food and  (he is unsure why she had stroke, but thinks it may have been related to polysubstance use- thinks she used heroin)  Oldest sister  from [facility], had MI  No one in family with autonomic issues or positional issues    Current Medications:  Current Outpatient Medications   Medication Sig Dispense Refill    aspirin 81 MG Oral Tab EC Take 1 tablet (81 mg total) by mouth daily. To be continued indefinitely      clopidogrel 75 MG Oral Tab Take 1 tablet (75 mg total) by mouth daily. 30 tablet 5    atorvastatin 80 MG Oral Tab Take 1 tablet (80 mg total) by mouth daily. 30 tablet 2    Insulin Glargine, 1 Unit Dial, (TOUJEO SOLOSTAR) 300 UNIT/ML Subcutaneous Solution Pen-injector INJECT UP TO 20 units nightly 45 mL 0    dapagliflozin (FARXIGA) 10 MG Oral Tab Take 1 tablet (10 mg total) by mouth daily.       BD PEN NEEDLE SHORT U/F 31G X 8 MM Does not apply Misc Use 4 times daily 400 each 3    METFORMIN HCL  MG Oral Tablet 24 Hr TAKE 2 TABLETS(1000 MG) BY MOUTH TWICE DAILY WITH MEALS 360 tablet 1    Continuous Blood Gluc Sensor (FREESTYLE NATE 14 DAY SENSOR) Does not apply Misc 1 each every 14 (fourteen) days. 6 each 3    levothyroxine 150 MCG Oral Tab TAKE ONE TABLET BY MOUTH ONCE DAILY 90 tablet 1    Insulin Aspart Pen (NOVOLOG FLEXPEN) 100 UNIT/ML Subcutaneous Solution Pen-injector INJECT 10-15 UNITS 3 TIMES PER DAY WITH MEALS AS DIRECTED 45 mL 1    Continuous Blood Gluc  (FREESTYLE NATE 14 DAY READER) Does not apply Device 1 each by Does not apply route as needed. 1 Device 0   Prior to stroke, was taking aspirin 81 mg daily (prevention)  Wasn't taking his long acting insulin regularly prior to stroke    ROS:  Taste seems off for past few months, appetite down a bit, no big changes in weight recently, no blood in stool    Objective/Physical Exam:    Vital Signs:  Weight 206 lb 9.6 oz (93.7 kg). 10/4/2023 3:12 PM 10/4/2023 3:20 PM 10/4/2023 3:22 PM   Ortho /72 144/70 138/70   BP Location Left arm Left arm Left arm   Patient Position Sitting Standing Supine   Cuff Size adult adult adult   Orthostatic Pulse 81        Standing at 3 minute 135/82, pulse 95  General: Alert, good recall of personal medical history    Respiratory: Non labored breathing on room air    Cardiovascular: Regular rate    Mental status: Alert, oriented, language fluent, comprehension intact    Cranial nerves: Optic disc margins sharp VF full to confrontation bilaterally. Pupils equal, round, equally reactive to light and accommodation. Extraocular eye movements intact without nystagmus. Face sensation intact to light touch. Face strength symmetric and intact. No dysarthria.     Motor:   Power:   -Right upper extremity: shoulder abductors 5, elbow extensors 5, elbow flexors 5, wrist extensors 5, finger extension 5, finger flexion 5  -Left upper extremity: shoulder abductors 5, elbow extensors 5, elbow flexors 5, wrist extensors 5, finger extension 5, finger flexion 5  -Right lower extremity: hip flexion 5, knee extension 5, dorsiflexion 5, plantarflexion 5  -Left lower extremity: hip flexion 5, knee extension 5, dorsiflexion 5, plantarflexion 5  Tone: Normal   Bulk: Normal  Abnormal movements: None    Sensory: Intact to light touch,   Pinprick increased in fingers bilaterally, diminished from hand to elbow bilaterally  Vibration: >20 seconds in distal extremities    Coordination: Finger to nose and heel to shin intact. MANISHA slow left foot with foot tapping and inconsistent rhythm    Reflexes: Right/Left: Biceps 1/1, brachioradialis 1/1, hoffmans negative. Patella 0/1, achilles 0/0, plantars downgoing. Gait: Narrow based, symmetric arm swing, slow, perhaps slightly more cautious, perhaps shorter stride on left, reported felt like he was going to hit right shoulder into doorframe when walking. Labs:   CSF 2023 cytology neg  Component      Latest Ref Rng 2023   GM1 Antibody, IgG      0 - 50 IV 4    GM1 Antibody, IgM      0 - 50 IV 3    GD1b Antibody, IgG      0 - 50 IV 6    GD1b Antibody, IgM      0 - 50 IV 3    GQ1b Antibody, IgG      0 - 50 IV 10    GQ1b Antibody, IgM      0 - 50 IV 3    TOTAL VOLUME CSF      mL 11.0    CSF TUBE # 4    Color CSF      Colorless  Colorless    Clarity CSF      Clear  Clear    TNC, CSF      0 - 5 /mm3 1    RBC CSF      <1 /mm3 201 (H)    MuSK Abs, Serum      U/mL <1.0    Total Protein CSF      15.0 - 45.0 mg/dL 52.9 (H)    Glucose CSF      40 - 70 mg/dL 94 (H)    AChR Binding Abs      0.00 - 0.24 nmol/L 0.04    AChR Blocking Abs      0 - 25 % 14    ACTH      7.2 - 63.3 pg/mL 74.9 (H)    Cortisol      ug/dL 21.8    ALDOSTERONE      0.0 - 30.0 ng/dL 6.5    RENIN ACTIVITY      0.167 - 5.380 ng/mL/hr 1.009    XI MISCELLANEOUS COMMENT       Cytology scant blood and rare lymphocytes    ACHR mod   Comment: Test Ordered: [id number] AChR-modulating Ab  Test(s) 747095-[id number]-ayrjfikway Ab  was developed and its performance characteristics determined  by [facility]. It has not been cleared or approved by the Food  and Drug Administration.   AChR-modulating Ab             2 %        01       Reference Range: 0-45                                                         Interpretive Information:                          Negative:             0 - 45%                          Positive:               > 45%  No single value for AChR-modulating antibody should  be used as a sole basis for diagnosis or response  to therapy. Legend:  (H) High    TSH 2.1 normal 2023    MuSK ab neg  GM1 AB IgG neg, GM1 AB IgM neg  ACHr bind neg  Achr block neg  ACHr modulating ab neg  Ganglioside and musk ab neg  B1 214 normal  B12 1395    ACTH 74.9, cortisol 21.8, aldosterone 6.5, renin 1.009    Recent Results (from the past 8760 hour(s))   Comp Metabolic Panel (14)    Collection Time: 08/21/23  5:36 AM   Result Value Ref Range    Glucose 162 (H) 70 - 99 mg/dL    Sodium 137 136 - 145 mmol/L    Potassium 4.1 3.5 - 5.1 mmol/L    Chloride 106 98 - 112 mmol/L    CO2 25.0 21.0 - 32.0 mmol/L    Anion Gap 6 0 - 18 mmol/L    BUN 14 7 - 18 mg/dL    Creatinine 0.97 0.70 - 1.30 mg/dL    Calcium, Total 9.0 8.5 - 10.1 mg/dL    Calculated Osmolality 288 275 - 295 mOsm/kg    eGFR-Cr 93 >=60 mL/min/1.73m2    AST 19 15 - 37 U/L    ALT 31 16 - 61 U/L    Alkaline Phosphatase 74 45 - 117 U/L    Bilirubin, Total 0.9 0.1 - 2.0 mg/dL    Total Protein 7.4 6.4 - 8.2 g/dL    Albumin 3.7 3.4 - 5.0 g/dL    Globulin  3.7 2.8 - 4.4 g/dL    A/G Ratio 1.0 1.0 - 2.0     *Note: Due to a large number of results and/or encounters for the requested time period, some results have not been displayed. A complete set of results can be found in Results Review.      Recent Results (from the past 8760 hour(s))   CBC W/ DIFFERENTIAL    Collection Time: 08/21/23  5:36 AM   Result Value Ref Range    WBC 6.3 4.0 - 11.0 x10(3) uL    RBC 5.40 4.30 - 5.70 x10(6)uL    HGB 15.5 13.0 - 17.5 g/dL    HCT 46.6 39.0 - 53.0 %    .0 150.0 - 450.0 10(3)uL    MCV 86.3 80.0 - 100.0 fL    MCH 28.7 26.0 - 34.0 pg    MCHC 33.3 31.0 - 37.0 g/dL    RDW 12.2 % Neutrophil Absolute Prelim 2.92 1.50 - 7.70 x10 (3) uL    Neutrophil Absolute 2.92 1.50 - 7.70 x10(3) uL    Lymphocyte Absolute 2.54 1.00 - 4.00 x10(3) uL    Monocyte Absolute 0.50 0.10 - 1.00 x10(3) uL    Eosinophil Absolute 0.31 0.00 - 0.70 x10(3) uL    Basophil Absolute 0.06 0.00 - 0.20 x10(3) uL    Immature Granulocyte Absolute 0.01 0.00 - 1.00 x10(3) uL    Neutrophil % 46.0 %    Lymphocyte % 40.1 %    Monocyte % 7.9 %    Eosinophil % 4.9 %    Basophil % 0.9 %    Immature Granulocyte % 0.2 %     *Note: Due to a large number of results and/or encounters for the requested time period, some results have not been displayed. A complete set of results can be found in Results Review. Imaging:  MR brain wwo 8/2023  INTRACRANIAL:  There is a small focus of restricted diffusion involving the left anterior thalamus. The area measures 11 x 3 mm consistent with an acute lacunar infarct. There are no new lesions identified. There are few foci of T2/FLAIR  hyperintensity in the subcortical white matter periventricular white matter consistent small vessel ischemic disease. There is no evidence of mass lesion or hemorrhage. Post-contrast imaging of the brain demonstrates no evidence of abnormal  enhancement. VENTRICLES/SULCI:   Ventricles and sulci are normal in caliber. There are no extra-axial fluid collections. There is no midline shift. SINUSES/ORBITS:       The visualized paranasal sinuses are clear. The orbits are unremarkable. MASTOIDS:       The mastoids are clear. Impression   CONCLUSION:  Stable findings of acute lacunar infarct of the left anterior lateral thalamus. Findings were discussed with the floor nurse at 1830 hours on 8/22/2023. CTA head/neck   FINDINGS:    VASCULATURE:  Moderate calcification of bilateral carotid siphons resulting in scattered mild stenoses. No significant stenosis of the anterior or middle cerebral arteries.   Moderate segmental stenosis of the left P2 segment of the posterior cerebral  artery. No visible aneurysm or vascular malformation. VENTRICLES:  Normal for age. No enlargement or displacement. CEREBRUM:  Normal for age. No excessive atrophy, mass, or hemorrhage, or abnormal enhancement. Chronic appearing lacunar infarcts of the right thalamus and basal ganglia. No CT correlate for acute lacunar infarct of the left thalamus noted on recent  MRI. CEREBELLUM:  Normal for age. No excessive atrophy, mass, or hemorrhage, or abnormal enhancement. BRAINSTEM:  Normal for age. No excessive atrophy, mass, or hemorrhage, or abnormal enhancement. BASAL CISTERNS:  No subarachnoid hemorrhage or effacement. SKULL:  Negative. LEFT  INTERNAL CAROTID:  Mild eccentric soft plaque of the proximal left ICA without hemodynamically significant stenosis or dissection. EXTERNAL CAROTID:  No hemodynamically significant stenosis or dissection  COMMON CAROTID:  No hemodynamically significant stenosis or dissection  VERTEBRAL:  No hemodynamically significant stenosis or dissection     RIGHT  INTERNAL CAROTID:   No hemodynamically significant stenosis or dissection  EXTERNAL CAROTID:    No hemodynamically significant stenosis or dissection  COMMON CAROTID:   No hemodynamically significant stenosis or dissection  VERTEBRAL:   No hemodynamically significant stenosis or dissection     OTHER:  The visualized soft tissues of the neck are also unremarkable. Impression   CONCLUSION:       1. Patency of intracranial arterial circulation with moderate segmental stenosis of the left P2 segment of the posterior cerebral artery. 2. Moderate calcified atherosclerosis of the carotid siphons without high-grade stenosis. 3. No large vessel occlusion, dissection, or aneurysm of the major arterial vasculature of the head or neck.           Emg/ncs 8/2023    Summary:   The left sural sensory response was normal.  The right sural sensory response had decreased amplitude. The right ulnar and radial sensory responses were normal.  The right median sensory response had prolonged distal latency. The right median motor response had prolonged distal latency. The right ulnar motor response was normal.  The bilateral tibial motor responses were normal.  The bilateral peroneal motor responses had borderline decreased amplitudes. Repetitive nerve stimulation showed no decrement in the right APB or nasalis. Needle EMG was performed on selected muscles of the right upper and lower extremities. There was mild motor unit remodeling seen in the right FDI and right tibialis anterior. Conclusion:  There is no electrophysiologic evidence of a neuromuscular junction process on this study. There is no electrophysiologic evidence of a polyneuropathy or myopathy. There is a right compression median mononeuropathy at the wrist, carpal tunnel. There is mild motor unit remodeling seen in the right tibialis anterior, which in the appropriate clinical setting, can be seen in a chronic L5 radiculopathy. Shawn Phalen, DO  Neurology and Neuromuscular medicine  [facility]    TTE 8/2023  1. Procedure narrative: Transthoracic echocardiography for ventricular      function evaluation and assessment of valvular function. Image quality      was suboptimal. The study was technically limited due to poor acoustic      window availability and body habitus. Intravenous contrast (Definity and      agitated saline) was administered to identify shunting, evaluate left      ventricular function, and opacify the LV. 2. Left ventricle: The cavity size was normal. Wall thickness was normal.      Systolic function was normal. The estimated ejection fraction was 55%. Left ventricular diastolic function parameters were normal.   3. Right ventricle:  The cavity size was normal. Systolic function was      normal.   4. Atrial septum: Agitated saline contrast study showed no shunt, at      baseline or with provocation. 5. Pulmonary arteries: Systolic pressure was within the normal range,      estimated to be 22mm Hg. The peak systolic pressure is 44FT Hg. *     ----------------------------------------------------------------------------   *   Findings:   Left ventricle: The cavity size was normal. Wall thickness was normal.   Systolic function was normal. The estimated ejection fraction was 55%. Left   ventricular diastolic function parameters were normal.   Left atrium:  The atrium was at the upper limits of normal. The left atrial   volume was upper normal.     Assessment:  Beatriz Orantes is a(n) 45year old male with a medical history of diabetes mellitus type 2, dyslipidemia, hypothyroidism, recent stroke, and recent diagnosis fo right carpal tunnel syndrome who presents for hospital follow up for stroke and positional neurologic symptoms. Patient presented 8/2023 with subacute onset of intermittent bilateral leg weakness, slurred speech, and paresthesias triggered by standing ongoing for a few weeks at presentation. Just prior to presentation recalls trouble with right hand numbness and coordinatino troubles, and stuttering speech. MRI brain with acute left thalamic lacunar infarct, and chronic small vessel ischemic changes. CTA head and neck with moderate intracranial atherosclerotic disease including moderate left P2 stenosis and mild anterior circulation extracranial atherosclerotic disease. TTE with upper normal volume LA. , A1C 8.6, normal tsh. Neurologic exam today notable for absence of orthostatic hypotension, hyporeflexia, and some distal loss to pinprick sensation, slow MANISHA left foot. Etiology of left thalamic infarct felt likely to be lacunar/related to small vessel disease, hemodynamic etiology secondary to low bp in setting of P2 stenosis also in differential. Of note, some of his presenting symptoms did not localize well to the thalamic stroke. He reports poorly controlled blood sugars for month prior to presentation Started on dual antiplatelet therapy while in hospital (previously on aspirin monotherapy which he reports was for primary prevention). Will check verify now P2Y12, if he is a plavix responder, will switch to plavix monotherapy, otherwise will continue aspirin long term; while awaiting P2Y12 assay results, will continue dual antiplatelet therapy. Given recent stroke and vascular risk factors, would be wary to hold antiplatelet agents for an elective procedure currently (he is considering getting a trigger finger surgery). Etiology of intermittent bilateral lower extremity weakness and chin paresthesias triggered by standing unclear. Electromyelogram and nerve conduction study (EMG/NCS) unrevealing (no electrophysiologic evidence of neuromuscular junction disorder, polyneuropathy, myopathy, Serum labs with normal CK, neg MuSK AB, neg GM1 AB IgG and IgM, neg ACHR (bind, block, modulate), neg ganglioside ab, B1 normal, B12 borderling high. Cortisol not suggestive of adrenal insufficiency. CSF with normal cell count, borderline elevated protein, negative cytology. Given pinprick deficits on exam, will pursue small fiber neuropathy work up (will ask colleague who knows patient to do skin biopsy). Question whether he could have some underlying autonomic dysfunction driving above symptoms which could relate to a small fiber neuropathy (though orthostatics negative today, he reports he does sweat). Will expand workup for other potential etiologies of small fiber neuropathy and autonomic dysfunction with a few more blood tests. He has diabetes which certainly could cause a small fiber neuropathy. No reported bony lesions on recent CT head to explain chin paresthesias which are intermittent.     Plan:  -Stroke risk reduction:    -Cholesterol: goal LDL < 70 mg/dL, high intensity statin continue atorvastatin 80 mg daily   -Blood pressure: goal normotension   -Healthy diet (produce rich, DASH diet, Mediterranean diet)   -Regular exercise (Moderate to vigorous-intensity aerobic exercise for at least 30 minutes a day, 3 to 4 times a week)   -Antithrombotic: continue aspirin 81 mg daily and plavix 75 mg daily (pending lab-work, this may change)   -Diabetes: work on good glucose control  -For positional leg symptoms and chin numbness   -Trial wearing compression stockings during the day   -Go to the lab ([facility], will have nurses call  you when ready)   -You will get a call to schedule skin punch biopsy (run this by your insurance prior to procedure to ensure you are aware of potential copay)    1. Thalamic stroke ([address]  - MISCELLANEOUS LAB TESTING [[facility] ONLY]; Future  - MISCELLANEOUS LAB TESTING [[facility] ONLY]    2. Orthostatic intolerance  - Monoclonal Protein Study; Future  - MISCELLANEOUS LAB TESTING [[facility]]; Future  - SJOGREN'S AB, ANTI-SS-A/-SS-B ([facility] Only)  - MISCELLANEOUS LAB TESTING [[facility]]  - MISCELLANEOUS LAB TESTING [[facility]]; Future  - MISCELLANEOUS LAB TESTING [[facility]]    3. Paresthesias  - Monoclonal Protein Study; Future  - MISCELLANEOUS LAB TESTING [[facility]]; Future  - SJOGREN'S AB, ANTI-SS-A/-SS-B ([facility])  - MISCELLANEOUS LAB TESTING [[facility]]  - MISCELLANEOUS LAB TESTING [[facility]]; Future  - MISCELLANEOUS LAB TESTING [[facility]]    4. Sensory impairment  - Monoclonal Protein Study; Future  - MISCELLANEOUS LAB TESTING [[facility]]; Future  - SJOGREN'S AB, ANTI-SS-A/-SS-B ([facility])  - MISCELLANEOUS LAB TESTING [[facility]]  - MISCELLANEOUS LAB TESTING [[facility]]; Future  - MISCELLANEOUS LAB TESTING [[facility]]    Total time 54 minutes including chart review, eliciting history, physical exam, and counseling.      Follow up with Dr. Alyce Drake in ~ 5 weeks, will also ask nursing staff to help arrange skin biopsy appointment with  Bee Lopez, DO

## 2023-10-05 ENCOUNTER — TELEPHONE (OUTPATIENT)
Dept: NEUROLOGY | Facility: CLINIC | Age: 53
End: 2023-10-05

## 2023-10-05 DIAGNOSIS — R44.8 SENSORY IMPAIRMENT: ICD-10-CM

## 2023-10-05 DIAGNOSIS — I95.1 ORTHOSTATIC INTOLERANCE: ICD-10-CM

## 2023-10-05 DIAGNOSIS — R20.2 PARESTHESIAS: ICD-10-CM

## 2023-10-05 DIAGNOSIS — I63.81 THALAMIC STROKE (HCC): ICD-10-CM

## 2023-10-05 NOTE — TELEPHONE ENCOUNTER
I recommend to complete stroke follow up visit and skin biopsy (unilateral ankle and foot) in same visit, on day he is already scheduled 11/9, but would move him up from 3:15 to 10:55. 3:15 should not be opened up as there are already 6 patients scheduled that afternoon.

## 2023-10-05 NOTE — TELEPHONE ENCOUNTER
Leeann Vizcarra, [address] [place] [address],  Can someone please fax lab orders from my office visit October 04, 2023 to [facility] [address]) and let patient know when sent? Printed current labs that have [facility].  Some do not have codes to link [facility]

## 2023-10-09 NOTE — TELEPHONE ENCOUNTER
Orders placed, printed and faxed to [facility] at [phone]. Patient notified; he states that the lab he wishes to present to is at [facility]. Per internet query this appears to be a [facility].    Orders replaced to align with [facility]. Faxed to [facility] / [phone]. Patient updated.

## 2023-10-09 NOTE — TELEPHONE ENCOUNTER
Per Dr. Sirisha Reece:    Erroll Rosalio test should work. Thanks for looking into this!  Corin Meraz

## 2023-10-10 ENCOUNTER — LAB REQUISITION (OUTPATIENT)
Dept: LAB | Facility: HOSPITAL | Age: 53
End: 2023-10-10
Payer: COMMERCIAL

## 2023-10-10 DIAGNOSIS — M72.0 PALMAR FASCIAL FIBROMATOSIS (DUPUYTREN): ICD-10-CM

## 2023-10-10 DIAGNOSIS — M65.341 TRIGGER FINGER, RIGHT RING FINGER: ICD-10-CM

## 2023-10-10 PROCEDURE — 88304 TISSUE EXAM BY PATHOLOGIST: CPT | Performed by: ORTHOPAEDIC SURGERY

## 2023-11-16 RX ORDER — ATORVASTATIN CALCIUM 80 MG/1
80 TABLET, FILM COATED ORAL DAILY
Qty: 30 TABLET | Refills: 2 | Status: SHIPPED | OUTPATIENT
Start: 2023-11-16

## 2023-11-16 NOTE — TELEPHONE ENCOUNTER
Pt is a Dr. Wong Boone pt      Medication: ATORVASTATIN 80 MG Oral Tab     Date of last refill: 08/23/23 (30/2)  [place] 53/62/93    Last office visit: 10/04/23  Due back to clinic per last office note:  5 weeks  Date next office visit scheduled:  No future appointments. Last OV note recommendation:     Plan:  -Stroke risk reduction:                -Cholesterol: goal LDL < 70 mg/dL, high intensity statin continue atorvastatin 80 mg daily               -Blood pressure: goal normotension               -Healthy diet (produce rich, DASH diet, Mediterranean diet)               -Regular exercise (Moderate to vigorous-intensity aerobic exercise for at least 30 minutes a day, 3 to 4 times a week)               -Antithrombotic: continue aspirin 81 mg daily and plavix 75 mg daily (pending lab-work, this may change)               -Diabetes: work on good glucose control  -For positional leg symptoms and chin numbness               -Trial wearing compression stockings during the day               -Go to the lab ([facility], will have nurses call  you when ready)               -You will get a call to schedule skin punch biopsy (run this by your insurance prior to procedure to ensure you are aware of potential copay)     1. Thalamic stroke ([address]  - MISCELLANEOUS LAB TESTING [[facility]]; Future  - MISCELLANEOUS LAB TESTING [[facility]]     2. Orthostatic intolerance  - Monoclonal Protein Study; Future  - MISCELLANEOUS LAB TESTING [[facility]]; Future  - SJOGREN'S AB, ANTI-SS-A/-SS-B ([facility])  - MISCELLANEOUS LAB TESTING [[facility]]  - MISCELLANEOUS LAB TESTING [[facility]]; Future  - MISCELLANEOUS LAB TESTING [[facility]]     3. Paresthesias  - Monoclonal Protein Study; Future  - MISCELLANEOUS LAB TESTING [[facility]];  Future  - SJOGREN'S AB, ANTI-SS-A/-SS-B ([facility])  - MISCELLANEOUS LAB TESTING [[facility]]  - MISCELLANEOUS LAB TESTING [[facility]]; Future  - MISCELLANEOUS LAB TESTING [[facility]]     4. Sensory impairment  - Monoclonal Protein Study; Future  - MISCELLANEOUS LAB TESTING [[facility]]; Future  - SJOGREN'S AB, ANTI-SS-A/-SS-B ([facility])  - MISCELLANEOUS LAB TESTING [[facility]]  - MISCELLANEOUS LAB TESTING [[facility]]; Future  - MISCELLANEOUS LAB TESTING [[facility]]     Total time 54 minutes including chart review, eliciting history, physical exam, and counseling.       Follow up with Dr. Elpidio Scott in ~ 5 weeks, will also ask nursing staff to help arrange skin biopsy appointment with Dr. Elpidio Scott

## 2023-12-07 RX ORDER — ASPIRIN 81 MG/1
81 TABLET ORAL DAILY
Qty: 30 TABLET | Refills: 0 | Status: SHIPPED | OUTPATIENT
Start: 2023-12-07

## 2023-12-07 NOTE — TELEPHONE ENCOUNTER
Medication: ASPIRIN LOW DOSE 81 MG Oral Tab EC     Date of last refill: 10/04/23   [place] 08[place]14[place]86    Last office visit: 10/04/23  Due back to clinic per last office note:  5 weeks  Date next office visit scheduled:  No future appointments.      Last OV note recommendation:     Plan:  -Stroke risk reduction:                -Cholesterol: goal LDL < 70 mg/dL, high intensity statin continue atorvastatin 80 mg daily               -Blood pressure: goal normotension               -Healthy diet (produce rich, DASH diet, Mediterranean diet)               -Regular exercise (Moderate to vigorous-intensity aerobic exercise for at least 30 minutes a day, 3 to 4 times a week)               -Antithrombotic: continue aspirin 81 mg daily and plavix 75 mg daily (pending lab-work, this may change)               -Diabetes: work on good glucose control  -For positional leg symptoms and chin numbness               -Trial wearing compression stockings during the day               -Go to the lab ([facility], will have nurses call  you when ready)               -You will get a call to schedule skin punch biopsy (run this by your insurance prior to procedure to ensure you are aware of potential copay)

## 2024-02-12 DIAGNOSIS — R53.1 WEAKNESS GENERALIZED: Primary | ICD-10-CM

## 2024-02-12 NOTE — TELEPHONE ENCOUNTER
Medication: ATORVASTATIN 80 MG Oral Tab      Date of last refill: 11/16/2023 (#30/2)  [place]     Last office visit: 10/04/2023  Due back to clinic per last office note:  Around 11/08/2023  Date next office visit scheduled:    No future appointments.        Last OV note recommendation:    Plan:  -Stroke risk reduction:                -Cholesterol: goal LDL < 70 mg/dL, high intensity statin continue atorvastatin 80 mg daily               -Blood pressure: goal normotension               -Healthy diet (produce rich, DASH diet, Mediterranean diet)               -Regular exercise (Moderate to vigorous-intensity aerobic exercise for at least 30 minutes a day, 3 to 4 times a week)               -Antithrombotic: continue aspirin 81 mg daily and plavix 75 mg daily (pending lab-work, this may change)               -Diabetes: work on good glucose control  -For positional leg symptoms and chin numbness               -Trial wearing compression stockings during the day               -Go to the lab ([facility], will have nurses call  you when ready)               -You will get a call to schedule skin punch biopsy (run this by your insurance prior to procedure to ensure you are aware of potential copay)     1. Thalamic stroke ([facility])  - MISCELLANEOUS LAB TESTING [[facility]]; Future  - MISCELLANEOUS LAB TESTING [[facility]]     2. Orthostatic intolerance  - Monoclonal Protein Study; Future  - MISCELLANEOUS LAB TESTING [[facility]]; Future  - SJOGREN'S AB, ANTI-SS-A/-SS-B ([facility])  - MISCELLANEOUS LAB TESTING [[facility]]  - MISCELLANEOUS LAB TESTING [[facility]]; Future  - MISCELLANEOUS LAB TESTING [[facility]]     3. Paresthesias  - Monoclonal Protein Study; Future  - MISCELLANEOUS LAB TESTING [[facility]]; Future  - SJOGREN'S AB, ANTI-SS-A/-SS-B ([facility])  - MISCELLANEOUS LAB TESTING [[facility]]  - MISCELLANEOUS LAB TESTING [[facility]];  Future  - MISCELLANEOUS LAB TESTING [[facility]]     4. Sensory impairment  - Monoclonal Protein Study; Future  - MISCELLANEOUS LAB TESTING [[facility]]; Future  - SJOGREN'S AB, ANTI-SS-A/-SS-B ([facility])  - MISCELLANEOUS LAB TESTING [[facility]]  - MISCELLANEOUS LAB TESTING [[facility]]; Future  - MISCELLANEOUS LAB TESTING [[facility]]     Total time 54 minutes including chart review, eliciting history, physical exam, and counseling.      Follow up with Dr. Lewis in ~ 5 weeks, will also ask nursing staff to help arrange skin biopsy appointment with Dr. Joshua Lopez, DO

## 2024-02-13 RX ORDER — ATORVASTATIN CALCIUM 80 MG/1
80 TABLET, FILM COATED ORAL DAILY
Qty: 30 TABLET | Refills: 2 | Status: SHIPPED | OUTPATIENT
Start: 2024-02-13

## 2025-02-14 NOTE — ED INITIAL ASSESSMENT (HPI)
Pt states checked sugar after work and noted it was 56, drank orange juice and increased to 150s, pt states afterwards began having headache that has not gone away.
Spoke to patient 2/11 confirmed new apt date and time. Understands labs are needed prior visit.       Confirmed with patient he will get lab work today, 02/14 to ensure they are done prior to visit Monday.   
Detail Level: Zone
Plan: If patient is interested in having removed to contact office for plastic surgeon referral.

## 2025-03-25 ENCOUNTER — HOSPITAL ENCOUNTER (EMERGENCY)
Facility: HOSPITAL | Age: 55
Discharge: HOME | End: 2025-03-25
Attending: EMERGENCY MEDICINE
Payer: COMMERCIAL

## 2025-03-25 VITALS
SYSTOLIC BLOOD PRESSURE: 159 MMHG | HEART RATE: 110 BPM | DIASTOLIC BLOOD PRESSURE: 71 MMHG | BODY MASS INDEX: 31.83 KG/M2 | RESPIRATION RATE: 20 BRPM | TEMPERATURE: 98 F | OXYGEN SATURATION: 94 % | HEIGHT: 68 IN | WEIGHT: 210 LBS

## 2025-03-25 DIAGNOSIS — S61.011A LACERATION OF RIGHT THUMB WITHOUT FOREIGN BODY WITHOUT DAMAGE TO NAIL, INITIAL ENCOUNTER: Primary | ICD-10-CM
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
## 2025-03-25 DIAGNOSIS — S61.451A DOG BITE OF RIGHT HAND, INITIAL ENCOUNTER: ICD-10-CM

## 2025-03-25 PROCEDURE — 12002 RPR S/N/AX/GEN/TRNK2.6-7.5CM: CPT

## 2025-03-25 PROCEDURE — 99283 EMERGENCY DEPT VISIT LOW MDM: CPT

## 2025-03-25 PROCEDURE — 90471 IMMUNIZATION ADMIN: CPT

## 2025-03-25 RX ORDER — LIDOCAINE HYDROCHLORIDE 10 MG/ML
1 INJECTION, SOLUTION INFILTRATION; PERINEURAL ONCE
Status: COMPLETED | OUTPATIENT
Start: 2025-03-25 | End: 2025-03-25

## 2025-03-25 RX ORDER — LIDOCAINE HYDROCHLORIDE 10 MG/ML
INJECTION, SOLUTION INFILTRATION; PERINEURAL
Status: COMPLETED
Start: 2025-03-25 | End: 2025-03-25

## 2025-03-25 NOTE — ED PROVIDER NOTES
Patient Seen in: [facility] Emergency Department      History     Chief Complaint   Patient presents with    Bite     Dog Bite     Stated Complaint:     Subjective:   HPI      Patient comes to the emergency department after sustaining laceration to the right thumb.  The patient states that he was trying to break up his dogs were fighting and he noted that his right thumb had been lacerated in the process.  He has no other areas of discomfort or injury.  He has no foreign body sensation within the wound.  He is able to fully flex and extend his thumb without difficulties.  Patient is unsure of when his last tetanus immunization was received.  Patient is on Plavix.    Objective:     Past Medical History:    Diabetes ([facility])    GERD    HYPERLIPIDEMIA    Stroke ([facility])    2023 appproximately    Unspecified hypothyroidism              Past Surgical History:   Procedure Laterality Date    Impact tooth rem bony w/comp  1993    wisdom teeth                Social History     Socioeconomic History    Marital status:     Number of children: 4    Years of education: 13   Occupational History    Occupation: [employer]     Comment: Marketing   Tobacco Use    Smoking status: Never    Smokeless tobacco: Never   Vaping Use    Vaping status: Never Used   Substance and Sexual Activity    Alcohol use: Yes     Alcohol/week: 0.0 standard drinks of alcohol    Drug use: No    Sexual activity: Yes     Partners: Female   Other Topics Concern     Service Yes    Blood Transfusions No    Caffeine Concern Yes     Comment: ice tea    Occupational Exposure No    Hobby Hazards No    Sleep Concern No    Stress Concern Yes    Weight Concern No    Special Diet No    Back Care No    Exercise No    Bike Helmet No    Seat Belt Yes    Self-Exams No   Social History Narrative    Lives with wife and children                  Physical Exam     ED Triage Vitals [03/25/25 1613]   /71   Pulse 110   Resp 20   Temp 97.5 °F (36.4 °C)    Temp src Oral   SpO2 94 %   O2 Device None (Room air)       Current Vitals:   Vital Signs  BP: 159/71  Pulse: 110  Resp: 20  Temp: 97.5 °F (36.4 °C)  Temp src: Oral    Oxygen Therapy  SpO2: 94 %  O2 Device: None (Room air)        Physical Exam  Vitals and nursing note reviewed.   Constitutional:       General: He is not in acute distress.     Appearance: He is well-developed. He is not ill-appearing.   Skin:     Comments: 3 cm long laceration is noted on the volar aspect of the patient's proximal right thumb.  The wound extends into the subdermis, but no exposure of the tendon is noted.  Patient is able to fully extend and flexes thumb against resistance without any difficulties.  Distal capillary fill, sensation and motor activity are all intact.  Patient does have brisk bleeding from the wound.   Neurological:      Mental Status: He is alert and oriented to person, place, and time.            ED Course   Labs Reviewed - No data to display         Laceration was anesthetized with lidocaine using a digital block.  The wound was cleansed and irrigated copiously.  There was no visible foreign body, nerve, bone , joint space, or tendon within the wound. The wound was closed using a simple closure with 5-0 nylon.  The quality of the closure was excellent there was noted to be brisk bleeding during the procedure, but hemostasis was obtained once closure was achieved with sutures.         MDM      Patient comes to the Emergency Department with right thumb dog bite with laceration.  Wound was repaired with nylon sutures.  There was no evidence of any underlying tenderness or joint or bony injury.  There is no evidence of foreign body either by symptom or by exam.  Patient was given tetanus booster.  Patient had penicillin initially noted as an allergy, but patient states did that he had received penicillin in particular Augmentin in the past.  He was prescribed Augmentin.  He was instructed follow-up closely with primary  care physician and have his sutures taken out in 10 days.  He expressed understanding that he should return for any acute change or worsening of symptoms, particularly if there is increasing pain, fever or drainage from the wound.        Medical Decision Making      Disposition and Plan     Clinical Impression:  1. Laceration of right thumb without foreign body without damage to nail, initial encounter    2. Dog bite of right hand, initial encounter         Disposition:  Discharge  3/25/2025  6:05 pm    Follow-up:  Arvind Montejo MD  [address] [address]  [phone]    Follow up            Medications Prescribed:  Current Discharge Medication List        START taking these medications    Details   amoxicillin clavulanate 875-125 MG Oral Tab Take 1 tablet by mouth 2 (two) times daily for 10 days.  Qty: 20 tablet, Refills: 0                 Supplementary Documentation:

## 2025-03-25 NOTE — DISCHARGE INSTRUCTIONS
Your sutures/staples need to be removed in 10 days.  You may follow up with your physician or return to the Emergency Department to do this.

## 2025-03-25 NOTE — ED INITIAL ASSESSMENT (HPI)
Pt got bit by his own dogs trying to separate them  that were worked up by another dog. Pt dogs are both vaccinated. Pt is on plavix but bleeding is controlled.Wound is on right thumb. Pt unsure about last tentaus shot  .